# Patient Record
Sex: FEMALE | Race: WHITE | NOT HISPANIC OR LATINO
[De-identification: names, ages, dates, MRNs, and addresses within clinical notes are randomized per-mention and may not be internally consistent; named-entity substitution may affect disease eponyms.]

---

## 2017-01-12 ENCOUNTER — RECORD ABSTRACTING (OUTPATIENT)
Age: 52
End: 2017-01-12

## 2017-01-12 DIAGNOSIS — E78.00 PURE HYPERCHOLESTEROLEMIA, UNSPECIFIED: ICD-10-CM

## 2017-11-16 ENCOUNTER — APPOINTMENT (OUTPATIENT)
Dept: PULMONOLOGY | Facility: CLINIC | Age: 52
End: 2017-11-16
Payer: MEDICAID

## 2017-11-16 VITALS
HEART RATE: 72 BPM | SYSTOLIC BLOOD PRESSURE: 128 MMHG | HEIGHT: 64 IN | WEIGHT: 245 LBS | DIASTOLIC BLOOD PRESSURE: 70 MMHG | TEMPERATURE: 98.1 F | BODY MASS INDEX: 41.83 KG/M2 | OXYGEN SATURATION: 98 %

## 2017-11-16 PROCEDURE — 99203 OFFICE O/P NEW LOW 30 MIN: CPT

## 2017-11-20 ENCOUNTER — APPOINTMENT (OUTPATIENT)
Dept: SURGERY | Facility: CLINIC | Age: 52
End: 2017-11-20
Payer: MEDICAID

## 2017-11-20 VITALS
TEMPERATURE: 98 F | HEIGHT: 64 IN | OXYGEN SATURATION: 97 % | HEART RATE: 81 BPM | WEIGHT: 245 LBS | SYSTOLIC BLOOD PRESSURE: 122 MMHG | BODY MASS INDEX: 41.83 KG/M2 | DIASTOLIC BLOOD PRESSURE: 77 MMHG

## 2017-11-20 DIAGNOSIS — Z83.3 FAMILY HISTORY OF DIABETES MELLITUS: ICD-10-CM

## 2017-11-20 DIAGNOSIS — Z82.5 FAMILY HISTORY OF ASTHMA AND OTHER CHRONIC LOWER RESPIRATORY DISEASES: ICD-10-CM

## 2017-11-20 DIAGNOSIS — Z87.412 PERSONAL HISTORY OF VULVAR DYSPLASIA: ICD-10-CM

## 2017-11-20 PROCEDURE — 99203 OFFICE O/P NEW LOW 30 MIN: CPT

## 2017-12-27 ENCOUNTER — OUTPATIENT (OUTPATIENT)
Dept: OUTPATIENT SERVICES | Facility: HOSPITAL | Age: 52
LOS: 1 days | End: 2017-12-27
Payer: MEDICAID

## 2017-12-27 VITALS
TEMPERATURE: 98 F | HEIGHT: 64 IN | WEIGHT: 242.95 LBS | SYSTOLIC BLOOD PRESSURE: 130 MMHG | OXYGEN SATURATION: 98 % | RESPIRATION RATE: 18 BRPM | DIASTOLIC BLOOD PRESSURE: 76 MMHG | HEART RATE: 82 BPM

## 2017-12-27 DIAGNOSIS — Z98.82 BREAST IMPLANT STATUS: Chronic | ICD-10-CM

## 2017-12-27 DIAGNOSIS — Z98.51 TUBAL LIGATION STATUS: Chronic | ICD-10-CM

## 2017-12-27 DIAGNOSIS — Z01.818 ENCOUNTER FOR OTHER PREPROCEDURAL EXAMINATION: ICD-10-CM

## 2017-12-27 DIAGNOSIS — Z87.39 PERSONAL HISTORY OF OTHER DISEASES OF THE MUSCULOSKELETAL SYSTEM AND CONNECTIVE TISSUE: Chronic | ICD-10-CM

## 2017-12-27 DIAGNOSIS — K42.9 UMBILICAL HERNIA WITHOUT OBSTRUCTION OR GANGRENE: ICD-10-CM

## 2017-12-27 DIAGNOSIS — E78.5 HYPERLIPIDEMIA, UNSPECIFIED: ICD-10-CM

## 2017-12-27 DIAGNOSIS — Z90.49 ACQUIRED ABSENCE OF OTHER SPECIFIED PARTS OF DIGESTIVE TRACT: Chronic | ICD-10-CM

## 2017-12-27 DIAGNOSIS — K43.9 VENTRAL HERNIA WITHOUT OBSTRUCTION OR GANGRENE: ICD-10-CM

## 2017-12-27 DIAGNOSIS — Z98.890 OTHER SPECIFIED POSTPROCEDURAL STATES: Chronic | ICD-10-CM

## 2017-12-27 DIAGNOSIS — J45.909 UNSPECIFIED ASTHMA, UNCOMPLICATED: ICD-10-CM

## 2017-12-27 DIAGNOSIS — F17.200 NICOTINE DEPENDENCE, UNSPECIFIED, UNCOMPLICATED: ICD-10-CM

## 2017-12-27 DIAGNOSIS — Z98.891 HISTORY OF UTERINE SCAR FROM PREVIOUS SURGERY: Chronic | ICD-10-CM

## 2017-12-27 PROCEDURE — G0463: CPT

## 2017-12-27 RX ORDER — SODIUM CHLORIDE 9 MG/ML
3 INJECTION INTRAMUSCULAR; INTRAVENOUS; SUBCUTANEOUS EVERY 8 HOURS
Qty: 0 | Refills: 0 | Status: DISCONTINUED | OUTPATIENT
Start: 2018-01-05 | End: 2018-01-05

## 2017-12-27 NOTE — H&P PST ADULT - FAMILY HISTORY
Father  Still living? No  Family history of diabetes mellitus, Age at diagnosis: Age Unknown     Grandparent  Still living? No  Family history of breast cancer, Age at diagnosis: Age Unknown

## 2017-12-27 NOTE — H&P PST ADULT - HISTORY OF PRESENT ILLNESS
This is a 51 y/o female c/o a This is a 53 y/o female c/o abdominal hernia associated with tenderness at times, she presents today for repair of ventral umbilical hernia

## 2017-12-27 NOTE — H&P PST ADULT - PSH
History of TMJ syndrome  s/p repair jaw lock   S/P appendectomy    S/P  section  x 2  S/P ovarian cystectomy    S/P tubal ligation  2004 History of TMJ syndrome  s/p repair jaw lock   S/P appendectomy    S/P breast augmentation    S/P  section  x 2  S/P ovarian cystectomy    S/P tubal ligation  2004

## 2017-12-27 NOTE — H&P PST ADULT - NSANTHOSAYNRD_GEN_A_CORE
No. KIARRA screening performed.  STOP BANG Legend: 0-2 = LOW Risk; 3-4 = INTERMEDIATE Risk; 5-8 = HIGH Risk

## 2018-01-05 ENCOUNTER — RESULT REVIEW (OUTPATIENT)
Age: 53
End: 2018-01-05

## 2018-01-05 ENCOUNTER — OUTPATIENT (OUTPATIENT)
Dept: OUTPATIENT SERVICES | Facility: HOSPITAL | Age: 53
LOS: 1 days | End: 2018-01-05
Payer: MEDICAID

## 2018-01-05 ENCOUNTER — TRANSCRIPTION ENCOUNTER (OUTPATIENT)
Age: 53
End: 2018-01-05

## 2018-01-05 VITALS
TEMPERATURE: 98 F | OXYGEN SATURATION: 96 % | SYSTOLIC BLOOD PRESSURE: 121 MMHG | HEART RATE: 78 BPM | RESPIRATION RATE: 16 BRPM | DIASTOLIC BLOOD PRESSURE: 81 MMHG

## 2018-01-05 VITALS
HEIGHT: 64 IN | TEMPERATURE: 98 F | WEIGHT: 242.95 LBS | DIASTOLIC BLOOD PRESSURE: 89 MMHG | OXYGEN SATURATION: 100 % | HEART RATE: 80 BPM | RESPIRATION RATE: 14 BRPM | SYSTOLIC BLOOD PRESSURE: 127 MMHG

## 2018-01-05 DIAGNOSIS — Z98.891 HISTORY OF UTERINE SCAR FROM PREVIOUS SURGERY: Chronic | ICD-10-CM

## 2018-01-05 DIAGNOSIS — Z98.890 OTHER SPECIFIED POSTPROCEDURAL STATES: Chronic | ICD-10-CM

## 2018-01-05 DIAGNOSIS — Z90.49 ACQUIRED ABSENCE OF OTHER SPECIFIED PARTS OF DIGESTIVE TRACT: Chronic | ICD-10-CM

## 2018-01-05 DIAGNOSIS — K43.9 VENTRAL HERNIA WITHOUT OBSTRUCTION OR GANGRENE: ICD-10-CM

## 2018-01-05 DIAGNOSIS — K42.9 UMBILICAL HERNIA WITHOUT OBSTRUCTION OR GANGRENE: ICD-10-CM

## 2018-01-05 DIAGNOSIS — Z98.51 TUBAL LIGATION STATUS: Chronic | ICD-10-CM

## 2018-01-05 DIAGNOSIS — Z98.82 BREAST IMPLANT STATUS: Chronic | ICD-10-CM

## 2018-01-05 DIAGNOSIS — Z87.39 PERSONAL HISTORY OF OTHER DISEASES OF THE MUSCULOSKELETAL SYSTEM AND CONNECTIVE TISSUE: Chronic | ICD-10-CM

## 2018-01-05 PROCEDURE — 49560: CPT

## 2018-01-05 PROCEDURE — 49560: CPT | Mod: AS

## 2018-01-05 PROCEDURE — 49585: CPT

## 2018-01-05 PROCEDURE — 49585: CPT | Mod: AS

## 2018-01-05 PROCEDURE — 93005 ELECTROCARDIOGRAM TRACING: CPT

## 2018-01-05 PROCEDURE — 88302 TISSUE EXAM BY PATHOLOGIST: CPT | Mod: 26

## 2018-01-05 RX ORDER — ACETAMINOPHEN WITH CODEINE 300MG-30MG
1 TABLET ORAL EVERY 4 HOURS
Qty: 0 | Refills: 0 | Status: DISCONTINUED | OUTPATIENT
Start: 2018-01-05 | End: 2018-01-05

## 2018-01-05 RX ORDER — SODIUM CHLORIDE 9 MG/ML
1000 INJECTION INTRAMUSCULAR; INTRAVENOUS; SUBCUTANEOUS
Qty: 0 | Refills: 0 | Status: DISCONTINUED | OUTPATIENT
Start: 2018-01-05 | End: 2018-01-13

## 2018-01-05 RX ORDER — ONDANSETRON 8 MG/1
4 TABLET, FILM COATED ORAL ONCE
Qty: 0 | Refills: 0 | Status: DISCONTINUED | OUTPATIENT
Start: 2018-01-05 | End: 2018-01-05

## 2018-01-05 RX ORDER — ACETAMINOPHEN 500 MG
1000 TABLET ORAL ONCE
Qty: 0 | Refills: 0 | Status: COMPLETED | OUTPATIENT
Start: 2018-01-05 | End: 2018-01-05

## 2018-01-05 RX ORDER — HYDROMORPHONE HYDROCHLORIDE 2 MG/ML
1 INJECTION INTRAMUSCULAR; INTRAVENOUS; SUBCUTANEOUS
Qty: 0 | Refills: 0 | Status: DISCONTINUED | OUTPATIENT
Start: 2018-01-05 | End: 2018-01-05

## 2018-01-05 RX ORDER — ALBUTEROL 90 UG/1
2 AEROSOL, METERED ORAL
Qty: 0 | Refills: 0 | COMMUNITY

## 2018-01-05 RX ORDER — ACETAMINOPHEN WITH CODEINE 300MG-30MG
1 TABLET ORAL
Qty: 8 | Refills: 0 | OUTPATIENT
Start: 2018-01-05

## 2018-01-05 RX ORDER — FENTANYL CITRATE 50 UG/ML
25 INJECTION INTRAVENOUS
Qty: 0 | Refills: 0 | Status: DISCONTINUED | OUTPATIENT
Start: 2018-01-05 | End: 2018-01-05

## 2018-01-05 RX ADMIN — SODIUM CHLORIDE 3 MILLILITER(S): 9 INJECTION INTRAMUSCULAR; INTRAVENOUS; SUBCUTANEOUS at 06:52

## 2018-01-05 RX ADMIN — FENTANYL CITRATE 25 MICROGRAM(S): 50 INJECTION INTRAVENOUS at 09:38

## 2018-01-05 RX ADMIN — HYDROMORPHONE HYDROCHLORIDE 1 MILLIGRAM(S): 2 INJECTION INTRAMUSCULAR; INTRAVENOUS; SUBCUTANEOUS at 11:55

## 2018-01-05 RX ADMIN — FENTANYL CITRATE 25 MICROGRAM(S): 50 INJECTION INTRAVENOUS at 10:38

## 2018-01-05 RX ADMIN — FENTANYL CITRATE 25 MICROGRAM(S): 50 INJECTION INTRAVENOUS at 10:08

## 2018-01-05 RX ADMIN — HYDROMORPHONE HYDROCHLORIDE 1 MILLIGRAM(S): 2 INJECTION INTRAMUSCULAR; INTRAVENOUS; SUBCUTANEOUS at 12:25

## 2018-01-05 RX ADMIN — FENTANYL CITRATE 25 MICROGRAM(S): 50 INJECTION INTRAVENOUS at 09:53

## 2018-01-05 RX ADMIN — Medication 400 MILLIGRAM(S): at 10:38

## 2018-01-05 RX ADMIN — Medication 1000 MILLIGRAM(S): at 12:25

## 2018-01-05 RX ADMIN — FENTANYL CITRATE 25 MICROGRAM(S): 50 INJECTION INTRAVENOUS at 10:23

## 2018-01-05 NOTE — ASU DISCHARGE PLAN (ADULT/PEDIATRIC). - MEDICATION SUMMARY - MEDICATIONS TO TAKE
I will START or STAY ON the medications listed below when I get home from the hospital:    acetaminophen-codeine 300 mg-30 mg oral tablet  -- 1 tab(s) by mouth every 4 hours, As needed, Moderate Pain (4 - 6) MDD:4  -- Indication: For if pain    Motrin 400 mg oral tablet  -- 1 tab(s) by mouth once a day, As Needed  -- Indication: For .    Lipitor 10 mg oral tablet  -- 1 tab(s) by mouth once a day  -- Indication: For .    Ventolin HFA 90 mcg/inh inhalation aerosol  -- 1 puff(s) inhaled once a day  -- Indication: For .    Visine 0.05% ophthalmic solution  -- 1 drop(s) to each affected eye 2 times a day  -- Indication: For .    Synthroid 125 mcg (0.125 mg) oral tablet  -- 1 tab(s) by mouth once a day  -- Indication: For .

## 2018-01-05 NOTE — BRIEF OPERATIVE NOTE - POST-OP DX
Umbilical hernia without obstruction and without gangrene  01/05/2018    Active  Sameer Trevizo  Ventral hernia without obstruction or gangrene  01/05/2018    Active  Sameer Trevizo

## 2018-01-05 NOTE — BRIEF OPERATIVE NOTE - PROCEDURE
<<-----Click on this checkbox to enter Procedure Umbilical hernia repair  01/05/2018    Active  RLIND  Repair of hernia ventral  01/05/2018    Active  RLIND

## 2018-01-05 NOTE — ASU PATIENT PROFILE, ADULT - PSH
History of TMJ syndrome  s/p repair jaw lock   S/P appendectomy    S/P breast augmentation    S/P  section  x 2  S/P ovarian cystectomy    S/P tubal ligation  2004

## 2018-01-08 LAB — SURGICAL PATHOLOGY FINAL REPORT - CH: SIGNIFICANT CHANGE UP

## 2018-01-11 ENCOUNTER — APPOINTMENT (OUTPATIENT)
Dept: SURGERY | Facility: CLINIC | Age: 53
End: 2018-01-11
Payer: MEDICAID

## 2018-01-11 VITALS
WEIGHT: 243 LBS | OXYGEN SATURATION: 97 % | BODY MASS INDEX: 41.48 KG/M2 | HEIGHT: 64 IN | DIASTOLIC BLOOD PRESSURE: 83 MMHG | SYSTOLIC BLOOD PRESSURE: 133 MMHG | HEART RATE: 86 BPM | TEMPERATURE: 98.3 F

## 2018-01-11 PROCEDURE — 99024 POSTOP FOLLOW-UP VISIT: CPT

## 2018-01-25 ENCOUNTER — APPOINTMENT (OUTPATIENT)
Dept: PULMONOLOGY | Facility: CLINIC | Age: 53
End: 2018-01-25

## 2018-01-30 ENCOUNTER — APPOINTMENT (OUTPATIENT)
Dept: SURGERY | Facility: CLINIC | Age: 53
End: 2018-01-30
Payer: MEDICAID

## 2018-01-30 VITALS
BODY MASS INDEX: 41.48 KG/M2 | HEART RATE: 84 BPM | TEMPERATURE: 97.9 F | WEIGHT: 243 LBS | DIASTOLIC BLOOD PRESSURE: 84 MMHG | HEIGHT: 64 IN | SYSTOLIC BLOOD PRESSURE: 120 MMHG

## 2018-01-30 PROCEDURE — 99024 POSTOP FOLLOW-UP VISIT: CPT

## 2018-02-22 ENCOUNTER — APPOINTMENT (OUTPATIENT)
Dept: SURGERY | Facility: CLINIC | Age: 53
End: 2018-02-22
Payer: MEDICAID

## 2018-02-22 VITALS
WEIGHT: 243 LBS | HEIGHT: 64 IN | BODY MASS INDEX: 41.48 KG/M2 | DIASTOLIC BLOOD PRESSURE: 90 MMHG | HEART RATE: 72 BPM | SYSTOLIC BLOOD PRESSURE: 124 MMHG

## 2018-02-22 PROCEDURE — 99024 POSTOP FOLLOW-UP VISIT: CPT

## 2018-06-15 ENCOUNTER — APPOINTMENT (OUTPATIENT)
Dept: ORTHOPEDIC SURGERY | Facility: CLINIC | Age: 53
End: 2018-06-15
Payer: MEDICAID

## 2018-06-15 VITALS
OXYGEN SATURATION: 98 % | WEIGHT: 232 LBS | HEART RATE: 83 BPM | HEIGHT: 64 IN | BODY MASS INDEX: 39.61 KG/M2 | DIASTOLIC BLOOD PRESSURE: 82 MMHG | SYSTOLIC BLOOD PRESSURE: 123 MMHG | TEMPERATURE: 98.5 F

## 2018-06-15 PROCEDURE — 99203 OFFICE O/P NEW LOW 30 MIN: CPT

## 2018-06-15 PROCEDURE — 72100 X-RAY EXAM L-S SPINE 2/3 VWS: CPT

## 2018-06-25 ENCOUNTER — APPOINTMENT (OUTPATIENT)
Dept: SURGERY | Facility: CLINIC | Age: 53
End: 2018-06-25
Payer: MEDICAID

## 2018-06-25 VITALS
TEMPERATURE: 98 F | HEIGHT: 64 IN | BODY MASS INDEX: 38.41 KG/M2 | DIASTOLIC BLOOD PRESSURE: 84 MMHG | WEIGHT: 225 LBS | SYSTOLIC BLOOD PRESSURE: 132 MMHG | HEART RATE: 77 BPM

## 2018-06-25 PROCEDURE — 99213 OFFICE O/P EST LOW 20 MIN: CPT

## 2018-06-26 LAB
ANION GAP SERPL CALC-SCNC: 15 MMOL/L
BASOPHILS # BLD AUTO: 0.02 K/UL
BASOPHILS NFR BLD AUTO: 0.3 %
BUN SERPL-MCNC: 16 MG/DL
CALCIUM SERPL-MCNC: 9.8 MG/DL
CHLORIDE SERPL-SCNC: 105 MMOL/L
CO2 SERPL-SCNC: 25 MMOL/L
CREAT SERPL-MCNC: 0.71 MG/DL
EOSINOPHIL # BLD AUTO: 0.04 K/UL
EOSINOPHIL NFR BLD AUTO: 0.5 %
GLUCOSE SERPL-MCNC: 95 MG/DL
HCT VFR BLD CALC: 42.9 %
HGB BLD-MCNC: 13.5 G/DL
IMM GRANULOCYTES NFR BLD AUTO: 0.3 %
LYMPHOCYTES # BLD AUTO: 2.47 K/UL
LYMPHOCYTES NFR BLD AUTO: 32.9 %
MAN DIFF?: NORMAL
MCHC RBC-ENTMCNC: 28.5 PG
MCHC RBC-ENTMCNC: 31.5 GM/DL
MCV RBC AUTO: 90.7 FL
MONOCYTES # BLD AUTO: 0.42 K/UL
MONOCYTES NFR BLD AUTO: 5.6 %
NEUTROPHILS # BLD AUTO: 4.54 K/UL
NEUTROPHILS NFR BLD AUTO: 60.4 %
PLATELET # BLD AUTO: 205 K/UL
POTASSIUM SERPL-SCNC: 4.9 MMOL/L
RBC # BLD: 4.73 M/UL
RBC # FLD: 14.2 %
SODIUM SERPL-SCNC: 145 MMOL/L
WBC # FLD AUTO: 7.51 K/UL

## 2018-07-06 ENCOUNTER — APPOINTMENT (OUTPATIENT)
Dept: ORTHOPEDIC SURGERY | Facility: CLINIC | Age: 53
End: 2018-07-06
Payer: MEDICAID

## 2018-07-06 DIAGNOSIS — M51.36 OTHER INTERVERTEBRAL DISC DEGENERATION, LUMBAR REGION: ICD-10-CM

## 2018-07-06 DIAGNOSIS — M48.07 SPINAL STENOSIS, LUMBOSACRAL REGION: ICD-10-CM

## 2018-07-06 PROCEDURE — 99214 OFFICE O/P EST MOD 30 MIN: CPT

## 2018-07-11 ENCOUNTER — OUTPATIENT (OUTPATIENT)
Dept: OUTPATIENT SERVICES | Facility: HOSPITAL | Age: 53
LOS: 1 days | End: 2018-07-11
Payer: MEDICAID

## 2018-07-11 VITALS
DIASTOLIC BLOOD PRESSURE: 74 MMHG | SYSTOLIC BLOOD PRESSURE: 130 MMHG | HEART RATE: 78 BPM | OXYGEN SATURATION: 98 % | RESPIRATION RATE: 18 BRPM | HEIGHT: 64 IN | TEMPERATURE: 99 F | WEIGHT: 231.04 LBS

## 2018-07-11 DIAGNOSIS — F17.200 NICOTINE DEPENDENCE, UNSPECIFIED, UNCOMPLICATED: ICD-10-CM

## 2018-07-11 DIAGNOSIS — Z98.891 HISTORY OF UTERINE SCAR FROM PREVIOUS SURGERY: Chronic | ICD-10-CM

## 2018-07-11 DIAGNOSIS — E78.5 HYPERLIPIDEMIA, UNSPECIFIED: ICD-10-CM

## 2018-07-11 DIAGNOSIS — Z01.818 ENCOUNTER FOR OTHER PREPROCEDURAL EXAMINATION: ICD-10-CM

## 2018-07-11 DIAGNOSIS — Z98.51 TUBAL LIGATION STATUS: Chronic | ICD-10-CM

## 2018-07-11 DIAGNOSIS — Z90.49 ACQUIRED ABSENCE OF OTHER SPECIFIED PARTS OF DIGESTIVE TRACT: Chronic | ICD-10-CM

## 2018-07-11 DIAGNOSIS — Z98.890 OTHER SPECIFIED POSTPROCEDURAL STATES: Chronic | ICD-10-CM

## 2018-07-11 DIAGNOSIS — R22.9 LOCALIZED SWELLING, MASS AND LUMP, UNSPECIFIED: ICD-10-CM

## 2018-07-11 DIAGNOSIS — R19.00 INTRA-ABDOMINAL AND PELVIC SWELLING, MASS AND LUMP, UNSPECIFIED SITE: ICD-10-CM

## 2018-07-11 DIAGNOSIS — K14.9 DISEASE OF TONGUE, UNSPECIFIED: Chronic | ICD-10-CM

## 2018-07-11 DIAGNOSIS — Z87.39 PERSONAL HISTORY OF OTHER DISEASES OF THE MUSCULOSKELETAL SYSTEM AND CONNECTIVE TISSUE: Chronic | ICD-10-CM

## 2018-07-11 DIAGNOSIS — Z98.82 BREAST IMPLANT STATUS: Chronic | ICD-10-CM

## 2018-07-11 PROCEDURE — G0463: CPT

## 2018-07-11 RX ORDER — IBUPROFEN 200 MG
1 TABLET ORAL
Qty: 0 | Refills: 0 | COMMUNITY

## 2018-07-11 RX ORDER — ALBUTEROL 90 UG/1
1 AEROSOL, METERED ORAL
Qty: 0 | Refills: 0 | COMMUNITY

## 2018-07-11 RX ORDER — TETRAHYDROZOLINE/POLYETHYL GLY 0.05 %-1 %
1 DROPS OPHTHALMIC (EYE)
Qty: 0 | Refills: 0 | COMMUNITY

## 2018-07-11 RX ORDER — SODIUM CHLORIDE 9 MG/ML
3 INJECTION INTRAMUSCULAR; INTRAVENOUS; SUBCUTANEOUS EVERY 8 HOURS
Qty: 0 | Refills: 0 | Status: DISCONTINUED | OUTPATIENT
Start: 2018-07-13 | End: 2018-07-21

## 2018-07-11 NOTE — H&P PST ADULT - NEGATIVE CARDIOVASCULAR SYMPTOMS
no palpitations/no orthopnea/no peripheral edema/no dyspnea on exertion/no paroxysmal nocturnal dyspnea/no claudication/no chest pain

## 2018-07-11 NOTE — H&P PST ADULT - PMH
Asthma  resolved, on no med  Hyperlipidemia    Hypothyroid    Morbid obesity Asthma  resolved, on no med  Back pain    Hyperlipidemia    Hypothyroid    Morbid obesity    Smoker unmotivated to quit

## 2018-07-11 NOTE — H&P PST ADULT - HISTORY OF PRESENT ILLNESS
This is a 53 y/o female c/o left flank mass associated with tenderness at times, she presents today for excision of left flank mass

## 2018-07-11 NOTE — H&P PST ADULT - PSH
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

## 2018-07-12 ENCOUNTER — TRANSCRIPTION ENCOUNTER (OUTPATIENT)
Age: 53
End: 2018-07-12

## 2018-07-13 ENCOUNTER — OUTPATIENT (OUTPATIENT)
Dept: OUTPATIENT SERVICES | Facility: HOSPITAL | Age: 53
LOS: 1 days | End: 2018-07-13
Payer: MEDICAID

## 2018-07-13 ENCOUNTER — APPOINTMENT (OUTPATIENT)
Dept: SURGERY | Facility: HOSPITAL | Age: 53
End: 2018-07-13

## 2018-07-13 ENCOUNTER — RESULT REVIEW (OUTPATIENT)
Age: 53
End: 2018-07-13

## 2018-07-13 VITALS
RESPIRATION RATE: 16 BRPM | SYSTOLIC BLOOD PRESSURE: 122 MMHG | HEART RATE: 77 BPM | DIASTOLIC BLOOD PRESSURE: 72 MMHG | TEMPERATURE: 98 F | HEIGHT: 64 IN | OXYGEN SATURATION: 98 % | WEIGHT: 231.04 LBS

## 2018-07-13 VITALS
OXYGEN SATURATION: 98 % | DIASTOLIC BLOOD PRESSURE: 66 MMHG | HEART RATE: 76 BPM | TEMPERATURE: 98 F | RESPIRATION RATE: 16 BRPM | SYSTOLIC BLOOD PRESSURE: 118 MMHG

## 2018-07-13 DIAGNOSIS — Z98.890 OTHER SPECIFIED POSTPROCEDURAL STATES: Chronic | ICD-10-CM

## 2018-07-13 DIAGNOSIS — R19.00 INTRA-ABDOMINAL AND PELVIC SWELLING, MASS AND LUMP, UNSPECIFIED SITE: ICD-10-CM

## 2018-07-13 DIAGNOSIS — Z87.39 PERSONAL HISTORY OF OTHER DISEASES OF THE MUSCULOSKELETAL SYSTEM AND CONNECTIVE TISSUE: Chronic | ICD-10-CM

## 2018-07-13 DIAGNOSIS — Z98.51 TUBAL LIGATION STATUS: Chronic | ICD-10-CM

## 2018-07-13 DIAGNOSIS — Z98.891 HISTORY OF UTERINE SCAR FROM PREVIOUS SURGERY: Chronic | ICD-10-CM

## 2018-07-13 DIAGNOSIS — Z98.82 BREAST IMPLANT STATUS: Chronic | ICD-10-CM

## 2018-07-13 DIAGNOSIS — Z90.49 ACQUIRED ABSENCE OF OTHER SPECIFIED PARTS OF DIGESTIVE TRACT: Chronic | ICD-10-CM

## 2018-07-13 DIAGNOSIS — K14.9 DISEASE OF TONGUE, UNSPECIFIED: Chronic | ICD-10-CM

## 2018-07-13 PROCEDURE — 21932 EXC BACK TUM DEEP < 5 CM: CPT

## 2018-07-13 RX ORDER — OXYCODONE AND ACETAMINOPHEN 5; 325 MG/1; MG/1
1 TABLET ORAL EVERY 4 HOURS
Qty: 0 | Refills: 0 | Status: DISCONTINUED | OUTPATIENT
Start: 2018-07-13 | End: 2018-07-13

## 2018-07-13 RX ORDER — ONDANSETRON 8 MG/1
4 TABLET, FILM COATED ORAL ONCE
Qty: 0 | Refills: 0 | Status: DISCONTINUED | OUTPATIENT
Start: 2018-07-13 | End: 2018-07-13

## 2018-07-13 RX ORDER — FENTANYL CITRATE 50 UG/ML
25 INJECTION INTRAVENOUS
Qty: 0 | Refills: 0 | Status: DISCONTINUED | OUTPATIENT
Start: 2018-07-13 | End: 2018-07-13

## 2018-07-13 RX ORDER — ACETAMINOPHEN 500 MG
1000 TABLET ORAL ONCE
Qty: 0 | Refills: 0 | Status: DISCONTINUED | OUTPATIENT
Start: 2018-07-13 | End: 2018-07-13

## 2018-07-13 RX ORDER — SODIUM CHLORIDE 9 MG/ML
1000 INJECTION, SOLUTION INTRAVENOUS
Qty: 0 | Refills: 0 | Status: DISCONTINUED | OUTPATIENT
Start: 2018-07-13 | End: 2018-07-21

## 2018-07-13 RX ORDER — OXYCODONE AND ACETAMINOPHEN 5; 325 MG/1; MG/1
1 TABLET ORAL
Qty: 16 | Refills: 0
Start: 2018-07-13

## 2018-07-13 RX ADMIN — SODIUM CHLORIDE 3 MILLILITER(S): 9 INJECTION INTRAMUSCULAR; INTRAVENOUS; SUBCUTANEOUS at 08:58

## 2018-07-13 NOTE — ASU PREOP CHECKLIST - 1.
pt. stated she has "panic attacks right before anesthesia is given, requests her IVL to be done in OR by anesthesiologist"

## 2018-07-13 NOTE — ASU DISCHARGE PLAN (ADULT/PEDIATRIC). - MEDICATION SUMMARY - MEDICATIONS TO TAKE
I will START or STAY ON the medications listed below when I get home from the hospital:    Percocet 5/325 oral tablet  -- 1 tab(s) by mouth every 6 hours MDD:4  -- Caution federal law prohibits the transfer of this drug to any person other  than the person for whom it was prescribed.  May cause drowsiness.  Alcohol may intensify this effect.  Use care when operating dangerous machinery.  This prescription cannot be refilled.  This product contains acetaminophen.  Do not use  with any other product containing acetaminophen to prevent possible liver damage.  Using more of this medication than prescribed may cause serious breathing problems.    -- Indication: For pain    Lipitor 10 mg oral tablet  -- 1 tab(s) by mouth once a day  -- Indication: For as previously perscribed    Xiidra 5% ophthalmic solution  -- 1 drop(s) to each affected eye 2 times a day  -- Indication: For as previously perscribed    Synthroid 125 mcg (0.125 mg) oral tablet  -- 1 tab(s) by mouth once a day  -- Indication: For as previously perscribed

## 2018-07-13 NOTE — ASU PATIENT PROFILE, ADULT - PMH
Asthma  resolved, on no med  Back pain    Hyperlipidemia    Hypothyroid    Morbid obesity    Smoker unmotivated to quit

## 2018-07-13 NOTE — ASU DISCHARGE PLAN (ADULT/PEDIATRIC). - NOTIFY
Bleeding that does not stop Fever greater than 101/Pain not relieved by Medications/Bleeding that does not stop

## 2018-07-16 PROBLEM — J45.909 UNSPECIFIED ASTHMA, UNCOMPLICATED: Chronic | Status: ACTIVE | Noted: 2017-12-27

## 2018-07-17 LAB — SURGICAL PATHOLOGY FINAL REPORT - CH: SIGNIFICANT CHANGE UP

## 2018-07-20 PROBLEM — Z86.59 HISTORY OF PANIC ATTACKS: Status: RESOLVED | Noted: 2017-11-20 | Resolved: 2018-07-20

## 2018-07-23 ENCOUNTER — APPOINTMENT (OUTPATIENT)
Dept: SURGERY | Facility: CLINIC | Age: 53
End: 2018-07-23
Payer: MEDICAID

## 2018-07-23 DIAGNOSIS — Z86.59 PERSONAL HISTORY OF OTHER MENTAL AND BEHAVIORAL DISORDERS: ICD-10-CM

## 2018-07-23 PROCEDURE — 99024 POSTOP FOLLOW-UP VISIT: CPT

## 2018-10-16 PROBLEM — F17.200 NICOTINE DEPENDENCE, UNSPECIFIED, UNCOMPLICATED: Chronic | Status: ACTIVE | Noted: 2018-07-11

## 2018-10-16 PROBLEM — E03.9 HYPOTHYROIDISM, UNSPECIFIED: Chronic | Status: ACTIVE | Noted: 2017-12-27

## 2018-10-16 PROBLEM — M54.9 DORSALGIA, UNSPECIFIED: Chronic | Status: ACTIVE | Noted: 2018-07-11

## 2018-10-16 PROBLEM — E66.01 MORBID (SEVERE) OBESITY DUE TO EXCESS CALORIES: Chronic | Status: ACTIVE | Noted: 2017-12-27

## 2018-10-16 PROBLEM — E78.5 HYPERLIPIDEMIA, UNSPECIFIED: Chronic | Status: ACTIVE | Noted: 2017-12-27

## 2018-10-29 ENCOUNTER — APPOINTMENT (OUTPATIENT)
Dept: SURGERY | Facility: CLINIC | Age: 53
End: 2018-10-29
Payer: MEDICAID

## 2018-10-29 VITALS
DIASTOLIC BLOOD PRESSURE: 78 MMHG | SYSTOLIC BLOOD PRESSURE: 117 MMHG | HEIGHT: 64 IN | BODY MASS INDEX: 38.41 KG/M2 | WEIGHT: 225 LBS | HEART RATE: 77 BPM

## 2018-10-29 PROCEDURE — 99213 OFFICE O/P EST LOW 20 MIN: CPT

## 2019-01-22 ENCOUNTER — APPOINTMENT (OUTPATIENT)
Dept: SURGERY | Facility: CLINIC | Age: 54
End: 2019-01-22
Payer: MEDICAID

## 2019-01-22 VITALS
SYSTOLIC BLOOD PRESSURE: 116 MMHG | HEIGHT: 64 IN | WEIGHT: 220 LBS | HEART RATE: 73 BPM | OXYGEN SATURATION: 95 % | BODY MASS INDEX: 37.56 KG/M2 | DIASTOLIC BLOOD PRESSURE: 81 MMHG

## 2019-01-22 PROCEDURE — 99213 OFFICE O/P EST LOW 20 MIN: CPT

## 2019-10-24 ENCOUNTER — MED ADMIN CHARGE (OUTPATIENT)
Age: 54
End: 2019-10-24

## 2019-10-24 ENCOUNTER — APPOINTMENT (OUTPATIENT)
Dept: INTERNAL MEDICINE | Facility: CLINIC | Age: 54
End: 2019-10-24
Payer: COMMERCIAL

## 2019-10-24 VITALS
SYSTOLIC BLOOD PRESSURE: 95 MMHG | TEMPERATURE: 97.3 F | HEART RATE: 87 BPM | WEIGHT: 222 LBS | HEIGHT: 64 IN | DIASTOLIC BLOOD PRESSURE: 67 MMHG | OXYGEN SATURATION: 92 % | BODY MASS INDEX: 37.9 KG/M2

## 2019-10-24 DIAGNOSIS — F32.9 ANXIETY DISORDER, UNSPECIFIED: ICD-10-CM

## 2019-10-24 DIAGNOSIS — F41.9 ANXIETY DISORDER, UNSPECIFIED: ICD-10-CM

## 2019-10-24 DIAGNOSIS — Z23 ENCOUNTER FOR IMMUNIZATION: ICD-10-CM

## 2019-10-24 PROCEDURE — 99213 OFFICE O/P EST LOW 20 MIN: CPT

## 2019-10-24 PROCEDURE — G0008: CPT

## 2019-10-24 PROCEDURE — 90686 IIV4 VACC NO PRSV 0.5 ML IM: CPT

## 2019-10-24 PROCEDURE — 99203 OFFICE O/P NEW LOW 30 MIN: CPT | Mod: 25,GC

## 2019-10-24 PROCEDURE — G0444 DEPRESSION SCREEN ANNUAL: CPT

## 2019-10-24 RX ORDER — AZITHROMYCIN 250 MG/1
250 TABLET, FILM COATED ORAL
Qty: 1 | Refills: 0 | Status: DISCONTINUED | COMMUNITY
Start: 2017-11-16 | End: 2019-10-24

## 2019-10-24 RX ORDER — CLONAZEPAM 0.5 MG/1
0.5 TABLET ORAL TWICE DAILY
Qty: 20 | Refills: 0 | Status: ACTIVE | COMMUNITY
Start: 2019-10-24 | End: 1900-01-01

## 2019-10-24 RX ORDER — FLUTICASONE FUROATE 100 UG/1
100 POWDER RESPIRATORY (INHALATION) DAILY
Qty: 1 | Refills: 0 | Status: DISCONTINUED | COMMUNITY
Start: 2017-11-18 | End: 2019-10-24

## 2019-10-24 RX ORDER — METHYLPREDNISOLONE 4 MG/1
4 TABLET ORAL
Qty: 1 | Refills: 0 | Status: DISCONTINUED | COMMUNITY
Start: 2017-11-16 | End: 2019-10-24

## 2019-10-28 ENCOUNTER — APPOINTMENT (OUTPATIENT)
Dept: INTERNAL MEDICINE | Facility: CLINIC | Age: 54
End: 2019-10-28

## 2019-11-01 ENCOUNTER — APPOINTMENT (OUTPATIENT)
Dept: INTERNAL MEDICINE | Facility: CLINIC | Age: 54
End: 2019-11-01

## 2019-11-07 ENCOUNTER — APPOINTMENT (OUTPATIENT)
Dept: INTERNAL MEDICINE | Facility: CLINIC | Age: 54
End: 2019-11-07

## 2019-11-14 ENCOUNTER — CLINICAL ADVICE (OUTPATIENT)
Age: 54
End: 2019-11-14

## 2019-11-14 ENCOUNTER — APPOINTMENT (OUTPATIENT)
Dept: INTERNAL MEDICINE | Facility: CLINIC | Age: 54
End: 2019-11-14

## 2019-11-19 ENCOUNTER — APPOINTMENT (OUTPATIENT)
Dept: INTERNAL MEDICINE | Facility: CLINIC | Age: 54
End: 2019-11-19

## 2019-11-20 ENCOUNTER — APPOINTMENT (OUTPATIENT)
Dept: INTERNAL MEDICINE | Facility: CLINIC | Age: 54
End: 2019-11-20

## 2019-11-21 ENCOUNTER — OUTPATIENT (OUTPATIENT)
Dept: OUTPATIENT SERVICES | Facility: HOSPITAL | Age: 54
LOS: 1 days | Discharge: HOME | End: 2019-11-21
Payer: COMMERCIAL

## 2019-11-21 ENCOUNTER — APPOINTMENT (OUTPATIENT)
Dept: PSYCHIATRY | Facility: CLINIC | Age: 54
End: 2019-11-21

## 2019-11-21 DIAGNOSIS — Z90.49 ACQUIRED ABSENCE OF OTHER SPECIFIED PARTS OF DIGESTIVE TRACT: Chronic | ICD-10-CM

## 2019-11-21 DIAGNOSIS — Z98.890 OTHER SPECIFIED POSTPROCEDURAL STATES: Chronic | ICD-10-CM

## 2019-11-21 DIAGNOSIS — K14.9 DISEASE OF TONGUE, UNSPECIFIED: Chronic | ICD-10-CM

## 2019-11-21 DIAGNOSIS — Z98.891 HISTORY OF UTERINE SCAR FROM PREVIOUS SURGERY: Chronic | ICD-10-CM

## 2019-11-21 DIAGNOSIS — Z98.82 BREAST IMPLANT STATUS: Chronic | ICD-10-CM

## 2019-11-21 DIAGNOSIS — Z98.51 TUBAL LIGATION STATUS: Chronic | ICD-10-CM

## 2019-11-21 DIAGNOSIS — Z87.39 PERSONAL HISTORY OF OTHER DISEASES OF THE MUSCULOSKELETAL SYSTEM AND CONNECTIVE TISSUE: Chronic | ICD-10-CM

## 2019-11-21 PROCEDURE — 90792 PSYCH DIAG EVAL W/MED SRVCS: CPT

## 2019-12-01 ENCOUNTER — OUTPATIENT (OUTPATIENT)
Dept: OUTPATIENT SERVICES | Facility: HOSPITAL | Age: 54
LOS: 1 days | Discharge: HOME | End: 2019-12-01
Payer: COMMERCIAL

## 2019-12-01 DIAGNOSIS — Z98.890 OTHER SPECIFIED POSTPROCEDURAL STATES: Chronic | ICD-10-CM

## 2019-12-01 DIAGNOSIS — Z98.82 BREAST IMPLANT STATUS: Chronic | ICD-10-CM

## 2019-12-01 DIAGNOSIS — Z98.891 HISTORY OF UTERINE SCAR FROM PREVIOUS SURGERY: Chronic | ICD-10-CM

## 2019-12-01 DIAGNOSIS — Z87.39 PERSONAL HISTORY OF OTHER DISEASES OF THE MUSCULOSKELETAL SYSTEM AND CONNECTIVE TISSUE: Chronic | ICD-10-CM

## 2019-12-01 DIAGNOSIS — K14.9 DISEASE OF TONGUE, UNSPECIFIED: Chronic | ICD-10-CM

## 2019-12-01 DIAGNOSIS — Z98.51 TUBAL LIGATION STATUS: Chronic | ICD-10-CM

## 2019-12-01 DIAGNOSIS — Z90.49 ACQUIRED ABSENCE OF OTHER SPECIFIED PARTS OF DIGESTIVE TRACT: Chronic | ICD-10-CM

## 2019-12-04 ENCOUNTER — APPOINTMENT (OUTPATIENT)
Dept: INTERNAL MEDICINE | Facility: CLINIC | Age: 54
End: 2019-12-04

## 2019-12-12 ENCOUNTER — APPOINTMENT (OUTPATIENT)
Dept: PSYCHIATRY | Facility: CLINIC | Age: 54
End: 2019-12-12

## 2019-12-31 DIAGNOSIS — F33.1 MAJOR DEPRESSIVE DISORDER, RECURRENT, MODERATE: ICD-10-CM

## 2020-01-07 ENCOUNTER — APPOINTMENT (OUTPATIENT)
Dept: INTERNAL MEDICINE | Facility: CLINIC | Age: 55
End: 2020-01-07

## 2020-01-09 ENCOUNTER — APPOINTMENT (OUTPATIENT)
Dept: PSYCHIATRY | Facility: CLINIC | Age: 55
End: 2020-01-09

## 2020-01-14 ENCOUNTER — LABORATORY RESULT (OUTPATIENT)
Age: 55
End: 2020-01-14

## 2020-01-14 ENCOUNTER — APPOINTMENT (OUTPATIENT)
Dept: INTERNAL MEDICINE | Facility: CLINIC | Age: 55
End: 2020-01-14
Payer: COMMERCIAL

## 2020-01-14 ENCOUNTER — TRANSCRIPTION ENCOUNTER (OUTPATIENT)
Age: 55
End: 2020-01-14

## 2020-01-14 VITALS
HEIGHT: 64 IN | HEART RATE: 107 BPM | SYSTOLIC BLOOD PRESSURE: 127 MMHG | WEIGHT: 214 LBS | OXYGEN SATURATION: 93 % | DIASTOLIC BLOOD PRESSURE: 87 MMHG | BODY MASS INDEX: 36.54 KG/M2 | TEMPERATURE: 97.4 F

## 2020-01-14 DIAGNOSIS — Z98.890 OTHER SPECIFIED POSTPROCEDURAL STATES: ICD-10-CM

## 2020-01-14 DIAGNOSIS — B36.9 SUPERFICIAL MYCOSIS, UNSPECIFIED: ICD-10-CM

## 2020-01-14 DIAGNOSIS — R61 GENERALIZED HYPERHIDROSIS: ICD-10-CM

## 2020-01-14 DIAGNOSIS — K63.5 POLYP OF COLON: ICD-10-CM

## 2020-01-14 PROCEDURE — 36415 COLL VENOUS BLD VENIPUNCTURE: CPT

## 2020-01-14 PROCEDURE — G0442 ANNUAL ALCOHOL SCREEN 15 MIN: CPT

## 2020-01-14 PROCEDURE — 99214 OFFICE O/P EST MOD 30 MIN: CPT | Mod: GC,25

## 2020-01-14 PROCEDURE — 99406 BEHAV CHNG SMOKING 3-10 MIN: CPT

## 2020-01-14 RX ORDER — IBUPROFEN 200 MG/1
200 TABLET, FILM COATED ORAL
Refills: 0 | Status: DISCONTINUED | COMMUNITY
Start: 2017-11-18 | End: 2020-01-14

## 2020-01-14 RX ORDER — CITALOPRAM HYDROBROMIDE 20 MG/1
20 TABLET, FILM COATED ORAL DAILY
Qty: 30 | Refills: 0 | Status: DISCONTINUED | COMMUNITY
Start: 2019-10-24 | End: 2020-01-14

## 2020-01-14 RX ORDER — VENLAFAXINE HYDROCHLORIDE 75 MG/1
75 CAPSULE, EXTENDED RELEASE ORAL
Refills: 0 | Status: ACTIVE | COMMUNITY
Start: 2020-01-14

## 2020-01-16 ENCOUNTER — APPOINTMENT (OUTPATIENT)
Dept: PSYCHIATRY | Facility: CLINIC | Age: 55
End: 2020-01-16

## 2020-01-16 LAB
ALBUMIN SERPL ELPH-MCNC: 4.9 G/DL
ALP BLD-CCNC: 75 U/L
ALT SERPL-CCNC: 12 U/L
ANION GAP SERPL CALC-SCNC: 17 MMOL/L
APTT BLD: 32.5 SEC
AST SERPL-CCNC: 17 U/L
BASOPHILS # BLD AUTO: 0.02 K/UL
BASOPHILS NFR BLD AUTO: 0.2 %
BILIRUB SERPL-MCNC: 0.3 MG/DL
BUN SERPL-MCNC: 18 MG/DL
CALCIUM SERPL-MCNC: 10.2 MG/DL
CHLORIDE SERPL-SCNC: 103 MMOL/L
CHOLEST SERPL-MCNC: 130 MG/DL
CHOLEST/HDLC SERPL: 2.6 RATIO
CO2 SERPL-SCNC: 23 MMOL/L
CREAT SERPL-MCNC: 1.04 MG/DL
EOSINOPHIL # BLD AUTO: 0.11 K/UL
EOSINOPHIL NFR BLD AUTO: 1.2 %
ESTIMATED AVERAGE GLUCOSE: 117 MG/DL
GLUCOSE SERPL-MCNC: 75 MG/DL
HBA1C MFR BLD HPLC: 5.7 %
HCT VFR BLD CALC: 48.2 %
HDLC SERPL-MCNC: 50 MG/DL
HGB BLD-MCNC: 15.5 G/DL
IMM GRANULOCYTES NFR BLD AUTO: 0.4 %
LDLC SERPL CALC-MCNC: 54 MG/DL
LYMPHOCYTES # BLD AUTO: 2.23 K/UL
LYMPHOCYTES NFR BLD AUTO: 23.5 %
MAN DIFF?: NORMAL
MCHC RBC-ENTMCNC: 30.1 PG
MCHC RBC-ENTMCNC: 32.2 GM/DL
MCV RBC AUTO: 93.6 FL
MONOCYTES # BLD AUTO: 0.45 K/UL
MONOCYTES NFR BLD AUTO: 4.7 %
MPO AB + PR3 PNL SER: NORMAL
NEUTROPHILS # BLD AUTO: 6.63 K/UL
NEUTROPHILS NFR BLD AUTO: 70 %
PLATELET # BLD AUTO: 219 K/UL
POTASSIUM SERPL-SCNC: 4.4 MMOL/L
PROT SERPL-MCNC: 7.4 G/DL
RBC # BLD: 5.15 M/UL
RBC # FLD: 13.3 %
SODIUM SERPL-SCNC: 143 MMOL/L
TRIGL SERPL-MCNC: 132 MG/DL
TSH SERPL-ACNC: 1.75 UIU/ML
WBC # FLD AUTO: 9.48 K/UL

## 2020-01-28 ENCOUNTER — APPOINTMENT (OUTPATIENT)
Dept: INTERNAL MEDICINE | Facility: CLINIC | Age: 55
End: 2020-01-28
Payer: COMMERCIAL

## 2020-01-28 VITALS
BODY MASS INDEX: 37.56 KG/M2 | HEART RATE: 93 BPM | TEMPERATURE: 97.1 F | DIASTOLIC BLOOD PRESSURE: 76 MMHG | WEIGHT: 220 LBS | OXYGEN SATURATION: 96 % | SYSTOLIC BLOOD PRESSURE: 114 MMHG | HEIGHT: 64 IN

## 2020-01-28 DIAGNOSIS — J30.9 ALLERGIC RHINITIS, UNSPECIFIED: ICD-10-CM

## 2020-01-28 DIAGNOSIS — Z72.0 TOBACCO USE: ICD-10-CM

## 2020-01-28 PROCEDURE — 99406 BEHAV CHNG SMOKING 3-10 MIN: CPT

## 2020-01-28 PROCEDURE — 99214 OFFICE O/P EST MOD 30 MIN: CPT | Mod: GC,25

## 2020-01-28 RX ORDER — VARENICLINE TARTRATE 0.5 (11)-1
0.5 MG X 11 & KIT ORAL
Qty: 1 | Refills: 0 | Status: ACTIVE | COMMUNITY
Start: 2020-01-28 | End: 1900-01-01

## 2020-01-28 RX ORDER — FLUTICASONE PROPIONATE 50 UG/1
50 SPRAY, METERED NASAL DAILY
Qty: 1 | Refills: 0 | Status: ACTIVE | COMMUNITY
Start: 2020-01-28 | End: 1900-01-01

## 2020-01-30 ENCOUNTER — APPOINTMENT (OUTPATIENT)
Dept: PSYCHIATRY | Facility: CLINIC | Age: 55
End: 2020-01-30

## 2020-01-30 RX ORDER — BUTALBITAL, ACETAMINOPHEN AND CAFFEINE 300; 50; 40 MG/1; MG/1; MG/1
50-300-40 CAPSULE ORAL EVERY 4 HOURS
Qty: 30 | Refills: 0 | Status: ACTIVE | COMMUNITY
Start: 2020-01-30 | End: 1900-01-01

## 2020-01-31 ENCOUNTER — APPOINTMENT (OUTPATIENT)
Dept: PSYCHIATRY | Facility: CLINIC | Age: 55
End: 2020-01-31

## 2020-02-11 ENCOUNTER — APPOINTMENT (OUTPATIENT)
Dept: INTERNAL MEDICINE | Facility: CLINIC | Age: 55
End: 2020-02-11
Payer: COMMERCIAL

## 2020-02-11 VITALS
WEIGHT: 218 LBS | SYSTOLIC BLOOD PRESSURE: 116 MMHG | TEMPERATURE: 97.8 F | HEIGHT: 64 IN | HEART RATE: 78 BPM | OXYGEN SATURATION: 96 % | DIASTOLIC BLOOD PRESSURE: 78 MMHG | BODY MASS INDEX: 37.22 KG/M2

## 2020-02-11 DIAGNOSIS — R09.81 NASAL CONGESTION: ICD-10-CM

## 2020-02-11 DIAGNOSIS — M47.817 SPONDYLOSIS W/OUT MYELOPATHY OR RADICULOPATHY, LUMBOSACRAL REGION: ICD-10-CM

## 2020-02-11 DIAGNOSIS — Z12.39 ENCOUNTER FOR OTHER SCREENING FOR MALIGNANT NEOPLASM OF BREAST: ICD-10-CM

## 2020-02-11 PROCEDURE — 99214 OFFICE O/P EST MOD 30 MIN: CPT | Mod: 25,GC

## 2020-02-12 ENCOUNTER — TRANSCRIPTION ENCOUNTER (OUTPATIENT)
Age: 55
End: 2020-02-12

## 2020-02-14 ENCOUNTER — APPOINTMENT (OUTPATIENT)
Dept: PSYCHIATRY | Facility: CLINIC | Age: 55
End: 2020-02-14

## 2020-02-20 ENCOUNTER — APPOINTMENT (OUTPATIENT)
Dept: PSYCHIATRY | Facility: CLINIC | Age: 55
End: 2020-02-20

## 2020-02-24 DIAGNOSIS — F32.1 MAJOR DEPRESSIVE DISORDER, SINGLE EPISODE, MODERATE: ICD-10-CM

## 2020-02-25 ENCOUNTER — LABORATORY RESULT (OUTPATIENT)
Age: 55
End: 2020-02-25

## 2020-02-25 ENCOUNTER — APPOINTMENT (OUTPATIENT)
Dept: RHEUMATOLOGY | Facility: CLINIC | Age: 55
End: 2020-02-25
Payer: MEDICAID

## 2020-02-25 PROCEDURE — 99205 OFFICE O/P NEW HI 60 MIN: CPT

## 2020-02-27 NOTE — PHYSICAL EXAM
[General Appearance - In No Acute Distress] : in no acute distress [General Appearance - Well Nourished] : well nourished [General Appearance - Alert] : alert [General Appearance - Well-Appearing] : healthy appearing [General Appearance - Well Developed] : well developed [Sclera] : the sclera and conjunctiva were normal [Outer Ear] : the ears and nose were normal in appearance [Examination Of The Oral Cavity] : the lips and gums were normal [Neck Appearance] : the appearance of the neck was normal [Nasal Cavity] : the nasal mucosa and septum were normal [Respiration, Rhythm And Depth] : normal respiratory rhythm and effort [Auscultation Breath Sounds / Voice Sounds] : lungs were clear to auscultation bilaterally [Heart Sounds] : normal S1 and S2 [Heart Rate And Rhythm] : heart rate was normal and rhythm regular [Cervical Lymph Nodes Enlarged Posterior Bilaterally] : posterior cervical [Edema] : there was no peripheral edema [Bowel Sounds] : normal bowel sounds [Cervical Lymph Nodes Enlarged Anterior Bilaterally] : anterior cervical [Axillary Lymph Nodes Enlarged Bilaterally] : axillary [No Spinal Tenderness] : no spinal tenderness [Musculoskeletal - Swelling] : no joint swelling seen [Sensation] : the sensory exam was normal to light touch and pinprick [Motor Exam] : the motor exam was normal [Oriented To Time, Place, And Person] : oriented to person, place, and time [FreeTextEntry1] : 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

## 2020-02-27 NOTE — HISTORY OF PRESENT ILLNESS
[Skin Lesions] : skin lesions [Dry Eyes] : dry eyes [Skin Nodules] : skin nodules [Anorexia] : no anorexia [FreeTextEntry1] : 54 year old female with livedo rash and pANCA + 1:160, negative MPO and PR3\par \par Developed this rash about 6mo ago, livedo reticularis. \par Dermatology got a skin biopsy - normal. Looks like they biopsied the erythematous part. \par Gastroenterology evaluation of epigastric pain and diarrhea, Colonoscopy nml.\par \par Has a history of sinus infections\par Deviated septum fixed early 2000s, complicated by difficulty breathing through her nose. Was re-evaluated by ENT. 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 We have no pathology on file. She still has ongoing sinus congestion and uses a lot of sprays. \par \par No ear infections \par No photosensitive rashes\par \par Dry eyes, uses artificial tears. No history of inflammatory eye disease\par No dry mouth.\par \par No history of asthma, no SOB or cough, no wheeze. Smoker, trying to quit. \par No hemoptysis\par No history of kidney problems, no history of proteinuria or hematuria. Denies any urinary changes of late.\par \par No dysphagia/odynophagia \par \par Possible history of subcutaneous nodules? Had a "lump" in her inner upper L arm that subsequently resolved. [Weight Loss] : no weight loss [Malaise] : no malaise [Fever] : no fever [Fatigue] : no fatigue [Chills] : no chills [Depression] : no depression [Malar Facial Rash] : no malar facial rash [Oral Ulcers] : no oral ulcers [Dry Mouth] : no dry mouth [Cough] : no cough [Shortness of Breath] : no shortness of breath [Arthralgias] : no arthralgias [Chest Pain] : no chest pain [Joint Warmth] : no joint warmth [Joint Swelling] : no joint swelling [Decreased ROM] : no decreased range of motion [Joint Deformity] : no joint deformity [Morning Stiffness] : no morning stiffness [Falls] : no falls [Difficulty Walking] : no difficulty walking [Difficulty Standing] : no difficulty standing [Dyspnea] : no dyspnea [Muscle Weakness] : no muscle weakness [Myalgias] : no myalgias [Muscle Cramping] : no muscle cramping [Muscle Spasms] : no muscle spasms [Visual Changes] : no visual changes [Eye Redness] : no eye redness

## 2020-02-28 ENCOUNTER — APPOINTMENT (OUTPATIENT)
Dept: PSYCHIATRY | Facility: CLINIC | Age: 55
End: 2020-02-28

## 2020-02-29 ENCOUNTER — NON-APPOINTMENT (OUTPATIENT)
Age: 55
End: 2020-02-29

## 2020-03-02 LAB
24R-OH-CALCIDIOL SERPL-MCNC: 41.8 PG/ML
25(OH)D3 SERPL-MCNC: 16 NG/ML
ACE BLD-CCNC: 35 U/L
ALBUMIN SERPL ELPH-MCNC: 4.5 G/DL
ALP BLD-CCNC: 72 U/L
ALT SERPL-CCNC: 14 U/L
ANA SER IF-ACNC: NEGATIVE
ANION GAP SERPL CALC-SCNC: 14 MMOL/L
APPEARANCE: CLEAR
AST SERPL-CCNC: 17 U/L
B2 GLYCOPROT1 IGA SERPL IA-ACNC: 5.6 SAU
B2 GLYCOPROT1 IGG SER-ACNC: <5 SGU
B2 GLYCOPROT1 IGM SER-ACNC: <5 SMU
BACTERIA: NEGATIVE
BASOPHILS # BLD AUTO: 0.03 K/UL
BASOPHILS NFR BLD AUTO: 0.5 %
BILIRUB SERPL-MCNC: 0.2 MG/DL
BILIRUBIN URINE: NEGATIVE
BLOOD URINE: NORMAL
BUN SERPL-MCNC: 14 MG/DL
CALCIUM SERPL-MCNC: 9.5 MG/DL
CARDIOLIPIN IGM SER-MCNC: 5.6 MPL
CARDIOLIPIN IGM SER-MCNC: <5 GPL
CCP AB SER IA-ACNC: <8 UNITS
CHLORIDE SERPL-SCNC: 106 MMOL/L
CO2 SERPL-SCNC: 22 MMOL/L
COLOR: NORMAL
CONFIRM: 31.7 SEC
CREAT SERPL-MCNC: 0.83 MG/DL
CREAT SPEC-SCNC: 45 MG/DL
CREAT/PROT UR: 0.1 RATIO
CRP SERPL-MCNC: 0.37 MG/DL
DEPRECATED CARDIOLIPIN IGA SER: <5 APL
DRVVT IMM 1:2 NP PPP: NORMAL
DRVVT SCREEN TO CONFIRM RATIO: 0.97 RATIO
EOSINOPHIL # BLD AUTO: 0.19 K/UL
EOSINOPHIL NFR BLD AUTO: 3.1 %
ERYTHROCYTE [SEDIMENTATION RATE] IN BLOOD BY WESTERGREN METHOD: 25 MM/HR
FERRITIN SERPL-MCNC: 61 NG/ML
GLUCOSE QUALITATIVE U: NEGATIVE
GLUCOSE SERPL-MCNC: 87 MG/DL
HBV CORE IGG+IGM SER QL: NONREACTIVE
HBV SURFACE AB SER QL: REACTIVE
HBV SURFACE AG SER QL: NONREACTIVE
HCT VFR BLD CALC: 45.8 %
HCV AB SER QL: NONREACTIVE
HCV S/CO RATIO: 0.14 S/CO
HGB BLD-MCNC: 14.5 G/DL
HYALINE CASTS: 0 /LPF
IGA SER QL IEP: 146 MG/DL
IGG SER QL IEP: 997 MG/DL
IGM SER QL IEP: 64 MG/DL
IMM GRANULOCYTES NFR BLD AUTO: 0.2 %
KETONES URINE: NEGATIVE
LEUKOCYTE ESTERASE URINE: NEGATIVE
LYMPHOCYTES # BLD AUTO: 1.67 K/UL
LYMPHOCYTES NFR BLD AUTO: 27.3 %
MAN DIFF?: NORMAL
MCHC RBC-ENTMCNC: 30.5 PG
MCHC RBC-ENTMCNC: 31.7 GM/DL
MCV RBC AUTO: 96.4 FL
MICROSCOPIC-UA: NORMAL
MONOCYTES # BLD AUTO: 0.34 K/UL
MONOCYTES NFR BLD AUTO: 5.6 %
MPO AB + PR3 PNL SER: NORMAL
MYELOPEROXIDASE AB SER QL IA: <5 UNITS
MYELOPEROXIDASE CELLS FLD QL: NEGATIVE
NEUTROPHILS # BLD AUTO: 3.87 K/UL
NEUTROPHILS NFR BLD AUTO: 63.3 %
NITRITE URINE: NEGATIVE
PH URINE: 6.5
PLATELET # BLD AUTO: 199 K/UL
POTASSIUM SERPL-SCNC: 4.5 MMOL/L
PROT SERPL-MCNC: 7 G/DL
PROT UR-MCNC: 5 MG/DL
PROTEIN URINE: NEGATIVE
PROTEINASE3 AB SER IA-ACNC: 17.2 UNITS
PROTEINASE3 AB SER-ACNC: NEGATIVE
PS IGA SER QL: <20 U/ML
PS IGG SER QL: <10 U/ML
PS IGM SER QL: <25 U/ML
RBC # BLD: 4.75 M/UL
RBC # FLD: 13.7 %
RED BLOOD CELLS URINE: 6 /HPF
RF+CCP IGG SER-IMP: NEGATIVE
RHEUMATOID FACT SER QL: <10 IU/ML
SCREEN DRVVT: 34.3 SEC
SILICA CLOTTING TIME INTERPRETATION: NORMAL
SILICA CLOTTING TIME S/C: 1.01 RATIO
SODIUM SERPL-SCNC: 141 MMOL/L
SPECIFIC GRAVITY URINE: 1.01
SQUAMOUS EPITHELIAL CELLS: 1 /HPF
UROBILINOGEN URINE: NORMAL
WBC # FLD AUTO: 6.11 K/UL
WHITE BLOOD CELLS URINE: 0 /HPF

## 2020-03-05 ENCOUNTER — APPOINTMENT (OUTPATIENT)
Dept: PSYCHIATRY | Facility: CLINIC | Age: 55
End: 2020-03-05

## 2020-03-06 ENCOUNTER — APPOINTMENT (OUTPATIENT)
Dept: PSYCHIATRY | Facility: CLINIC | Age: 55
End: 2020-03-06

## 2020-03-13 ENCOUNTER — APPOINTMENT (OUTPATIENT)
Dept: PSYCHIATRY | Facility: CLINIC | Age: 55
End: 2020-03-13

## 2020-03-19 ENCOUNTER — APPOINTMENT (OUTPATIENT)
Dept: PSYCHIATRY | Facility: CLINIC | Age: 55
End: 2020-03-19

## 2020-03-20 ENCOUNTER — APPOINTMENT (OUTPATIENT)
Dept: PSYCHIATRY | Facility: CLINIC | Age: 55
End: 2020-03-20

## 2020-03-27 ENCOUNTER — APPOINTMENT (OUTPATIENT)
Dept: PSYCHIATRY | Facility: CLINIC | Age: 55
End: 2020-03-27

## 2020-04-03 ENCOUNTER — APPOINTMENT (OUTPATIENT)
Dept: PSYCHIATRY | Facility: CLINIC | Age: 55
End: 2020-04-03

## 2020-04-10 ENCOUNTER — APPOINTMENT (OUTPATIENT)
Dept: PSYCHIATRY | Facility: CLINIC | Age: 55
End: 2020-04-10

## 2020-04-16 ENCOUNTER — APPOINTMENT (OUTPATIENT)
Dept: PSYCHIATRY | Facility: CLINIC | Age: 55
End: 2020-04-16

## 2020-04-16 ENCOUNTER — TRANSCRIPTION ENCOUNTER (OUTPATIENT)
Age: 55
End: 2020-04-16

## 2020-04-16 RX ORDER — GABAPENTIN 300 MG/1
300 CAPSULE ORAL
Qty: 270 | Refills: 0 | Status: DISCONTINUED | COMMUNITY
Start: 2020-01-16 | End: 2020-04-16

## 2020-04-17 ENCOUNTER — APPOINTMENT (OUTPATIENT)
Dept: PSYCHIATRY | Facility: CLINIC | Age: 55
End: 2020-04-17

## 2020-04-24 ENCOUNTER — APPOINTMENT (OUTPATIENT)
Dept: PSYCHIATRY | Facility: CLINIC | Age: 55
End: 2020-04-24

## 2020-04-27 ENCOUNTER — APPOINTMENT (OUTPATIENT)
Dept: GERIATRICS | Facility: CLINIC | Age: 55
End: 2020-04-27

## 2020-04-29 RX ORDER — NYSTATIN 100000 U/G
100000 OINTMENT TOPICAL 3 TIMES DAILY
Qty: 1 | Refills: 3 | Status: ACTIVE | COMMUNITY
Start: 2020-01-14 | End: 1900-01-01

## 2020-04-30 ENCOUNTER — APPOINTMENT (OUTPATIENT)
Dept: INTERNAL MEDICINE | Facility: CLINIC | Age: 55
End: 2020-04-30
Payer: MEDICAID

## 2020-04-30 DIAGNOSIS — E03.9 HYPOTHYROIDISM, UNSPECIFIED: ICD-10-CM

## 2020-04-30 DIAGNOSIS — J21.9 ACUTE BRONCHIOLITIS, UNSPECIFIED: ICD-10-CM

## 2020-04-30 DIAGNOSIS — Z78.9 OTHER SPECIFIED HEALTH STATUS: ICD-10-CM

## 2020-04-30 DIAGNOSIS — R19.00 INTRA-ABDOMINAL AND PELVIC SWELLING, MASS AND LUMP, UNSPECIFIED SITE: ICD-10-CM

## 2020-04-30 DIAGNOSIS — M54.16 RADICULOPATHY, LUMBAR REGION: ICD-10-CM

## 2020-04-30 PROCEDURE — 99442: CPT

## 2020-05-01 ENCOUNTER — APPOINTMENT (OUTPATIENT)
Dept: PSYCHIATRY | Facility: CLINIC | Age: 55
End: 2020-05-01

## 2020-05-07 ENCOUNTER — APPOINTMENT (OUTPATIENT)
Dept: INTERNAL MEDICINE | Facility: CLINIC | Age: 55
End: 2020-05-07

## 2020-05-08 ENCOUNTER — APPOINTMENT (OUTPATIENT)
Dept: PSYCHIATRY | Facility: CLINIC | Age: 55
End: 2020-05-08

## 2020-05-13 ENCOUNTER — APPOINTMENT (OUTPATIENT)
Dept: INTERNAL MEDICINE | Facility: CLINIC | Age: 55
End: 2020-05-13
Payer: MEDICAID

## 2020-05-13 ENCOUNTER — APPOINTMENT (OUTPATIENT)
Dept: INTERNAL MEDICINE | Facility: CLINIC | Age: 55
End: 2020-05-13

## 2020-05-13 PROCEDURE — 99442: CPT

## 2020-05-13 RX ORDER — SUMATRIPTAN 50 MG/1
50 TABLET, FILM COATED ORAL
Qty: 9 | Refills: 0 | Status: ACTIVE | COMMUNITY
Start: 2020-01-28 | End: 1900-01-01

## 2020-05-14 ENCOUNTER — APPOINTMENT (OUTPATIENT)
Dept: PSYCHIATRY | Facility: CLINIC | Age: 55
End: 2020-05-14

## 2020-05-15 ENCOUNTER — APPOINTMENT (OUTPATIENT)
Dept: PSYCHIATRY | Facility: CLINIC | Age: 55
End: 2020-05-15

## 2020-05-19 DIAGNOSIS — L03.011 CELLULITIS OF RIGHT FINGER: ICD-10-CM

## 2020-05-19 RX ORDER — CEPHALEXIN 500 MG/1
500 CAPSULE ORAL 3 TIMES DAILY
Qty: 21 | Refills: 0 | Status: ACTIVE | COMMUNITY
Start: 2020-05-19 | End: 1900-01-01

## 2020-05-21 ENCOUNTER — APPOINTMENT (OUTPATIENT)
Dept: PSYCHIATRY | Facility: CLINIC | Age: 55
End: 2020-05-21

## 2020-05-22 ENCOUNTER — APPOINTMENT (OUTPATIENT)
Dept: PSYCHIATRY | Facility: CLINIC | Age: 55
End: 2020-05-22

## 2020-05-27 ENCOUNTER — APPOINTMENT (OUTPATIENT)
Dept: INTERNAL MEDICINE | Facility: CLINIC | Age: 55
End: 2020-05-27
Payer: MEDICAID

## 2020-05-27 DIAGNOSIS — F32.2 MAJOR DEPRESSIVE DISORDER, SINGLE EPISODE, SEVERE W/OUT PSYCHOTIC FEATURES: ICD-10-CM

## 2020-05-27 DIAGNOSIS — G43.909 MIGRAINE, UNSPECIFIED, NOT INTRACTABLE, W/OUT STATUS MIGRAINOSUS: ICD-10-CM

## 2020-05-27 PROCEDURE — 99442: CPT

## 2020-05-27 RX ORDER — TRAZODONE HYDROCHLORIDE 100 MG/1
100 TABLET ORAL
Refills: 0 | Status: ACTIVE | COMMUNITY
Start: 2020-01-14

## 2020-05-28 ENCOUNTER — APPOINTMENT (OUTPATIENT)
Dept: PSYCHIATRY | Facility: CLINIC | Age: 55
End: 2020-05-28

## 2020-05-28 PROCEDURE — 90834 PSYTX W PT 45 MINUTES: CPT | Mod: 95

## 2020-05-29 ENCOUNTER — APPOINTMENT (OUTPATIENT)
Dept: PSYCHIATRY | Facility: CLINIC | Age: 55
End: 2020-05-29

## 2020-06-02 ENCOUNTER — APPOINTMENT (OUTPATIENT)
Dept: PSYCHIATRY | Facility: CLINIC | Age: 55
End: 2020-06-02

## 2020-06-02 PROCEDURE — 90834 PSYTX W PT 45 MINUTES: CPT | Mod: 95

## 2020-06-02 RX ORDER — PREGABALIN 300 MG/1
300 CAPSULE ORAL
Qty: 60 | Refills: 1 | Status: ACTIVE | COMMUNITY
Start: 2020-04-16 | End: 1900-01-01

## 2020-06-04 ENCOUNTER — APPOINTMENT (OUTPATIENT)
Dept: PSYCHIATRY | Facility: CLINIC | Age: 55
End: 2020-06-04

## 2020-06-09 ENCOUNTER — APPOINTMENT (OUTPATIENT)
Dept: INTERNAL MEDICINE | Facility: CLINIC | Age: 55
End: 2020-06-09
Payer: MEDICAID

## 2020-06-09 DIAGNOSIS — R23.1 PALLOR: ICD-10-CM

## 2020-06-09 PROCEDURE — 99443: CPT | Mod: GC

## 2020-06-09 RX ORDER — VARENICLINE TARTRATE 1 MG/1
1 TABLET, FILM COATED ORAL TWICE DAILY
Qty: 1 | Refills: 1 | Status: ACTIVE | COMMUNITY
Start: 2020-01-28 | End: 1900-01-01

## 2020-06-17 ENCOUNTER — APPOINTMENT (OUTPATIENT)
Dept: PSYCHIATRY | Facility: CLINIC | Age: 55
End: 2020-06-17

## 2020-06-18 ENCOUNTER — APPOINTMENT (OUTPATIENT)
Dept: PSYCHIATRY | Facility: CLINIC | Age: 55
End: 2020-06-18

## 2020-07-02 ENCOUNTER — APPOINTMENT (OUTPATIENT)
Dept: PSYCHIATRY | Facility: CLINIC | Age: 55
End: 2020-07-02

## 2020-07-13 ENCOUNTER — APPOINTMENT (OUTPATIENT)
Dept: INTERNAL MEDICINE | Facility: CLINIC | Age: 55
End: 2020-07-13
Payer: MEDICAID

## 2020-07-13 DIAGNOSIS — E55.9 VITAMIN D DEFICIENCY, UNSPECIFIED: ICD-10-CM

## 2020-07-13 DIAGNOSIS — M54.9 DORSALGIA, UNSPECIFIED: ICD-10-CM

## 2020-07-13 PROCEDURE — 99443: CPT | Mod: GC

## 2020-07-13 RX ORDER — DICLOFENAC SODIUM 10 MG/G
1 GEL TOPICAL DAILY
Qty: 1 | Refills: 0 | Status: ACTIVE | COMMUNITY
Start: 2020-07-13 | End: 1900-01-01

## 2020-07-13 RX ORDER — LEVOTHYROXINE SODIUM 0.12 MG/1
125 TABLET ORAL DAILY
Qty: 30 | Refills: 2 | Status: ACTIVE | COMMUNITY
Start: 2020-07-13 | End: 1900-01-01

## 2020-07-13 RX ORDER — VARENICLINE TARTRATE 1 MG/1
1 TABLET, FILM COATED ORAL
Qty: 1 | Refills: 0 | Status: ACTIVE | COMMUNITY
Start: 2020-07-13 | End: 1900-01-01

## 2020-07-16 ENCOUNTER — APPOINTMENT (OUTPATIENT)
Dept: PSYCHIATRY | Facility: CLINIC | Age: 55
End: 2020-07-16

## 2020-07-30 ENCOUNTER — APPOINTMENT (OUTPATIENT)
Dept: PSYCHIATRY | Facility: CLINIC | Age: 55
End: 2020-07-30

## 2020-08-19 ENCOUNTER — APPOINTMENT (OUTPATIENT)
Dept: INTERNAL MEDICINE | Facility: CLINIC | Age: 55
End: 2020-08-19
Payer: MEDICAID

## 2020-08-19 DIAGNOSIS — R23.2 FLUSHING: ICD-10-CM

## 2020-08-19 DIAGNOSIS — R23.1 PALLOR: ICD-10-CM

## 2020-08-19 DIAGNOSIS — R31.29 OTHER MICROSCOPIC HEMATURIA: ICD-10-CM

## 2020-08-19 DIAGNOSIS — F17.200 NICOTINE DEPENDENCE, UNSPECIFIED, UNCOMPLICATED: ICD-10-CM

## 2020-08-19 DIAGNOSIS — R92.8 OTHER ABNORMAL AND INCONCLUSIVE FINDINGS ON DIAGNOSTIC IMAGING OF BREAST: ICD-10-CM

## 2020-08-19 PROCEDURE — 99442: CPT | Mod: GC

## 2020-08-19 RX ORDER — LORATADINE 5 MG/5 ML
10 SOLUTION, ORAL ORAL
Qty: 30 | Refills: 3 | Status: DISCONTINUED | COMMUNITY
Start: 2020-06-02 | End: 2020-08-19

## 2020-08-19 RX ORDER — ATORVASTATIN CALCIUM 10 MG/1
10 TABLET, FILM COATED ORAL
Qty: 90 | Refills: 1 | Status: DISCONTINUED | COMMUNITY
End: 2020-08-19

## 2020-08-19 NOTE — HISTORY OF PRESENT ILLNESS
[Home] : at home, [unfilled] , at the time of the visit. [Other Location: e.g. Home (Enter Location, City,State)___] : at [unfilled] [Verbal consent obtained from patient] : the patient, [unfilled] [Rash (Location) ___] : rash on [unfilled] [___ Days ago] : [unfilled] days ago [FreeTextEntry2] : tender to palpation [FreeTextEntry8] : EMANI HORAN, an established patient at this office, reached out to this provider for [ a telephonic visit ]. No recent visit with the last 7 days. A visit is not scheduled within the next 7 days at this time.\par Discussed with patient: You have chosen to receive care through the use of tele-media. Tele-media enables health care\par providers at different locations to provide safe, effective and convenient care through the use of technology. Please note that\par this is a billable encounter. As with any health care service, there are risks associated with the use of tele-media, including\par equipment failure, poor image and/or resolution, and  issues. You understand that I cannot physically\par examine you and that you may need to come to the clinic to complete the assessment. Patient agreed verbally to\par understanding the risks and benefits of tele-media as explained. All questions regarding tele-media encounters were\par answered.\par Total time spent with the patient on the phone was [45 minutes].\par \par 54F PMH anxiety/MDD, hypothyroidism and hot flashes who presents to clinic via telephone interview for complaint of rash on her backside. The patient states she first noticed the rash yesterday because the area was tender to palpation. She states that as she cannot see it on herself, she took a picture and that the area appears "red and looks like a dry blister". Patient states that it does not bother her unless she touches it, but she does not like the way it looks. She has cleaned the area with an alcohol swab and begun using previously prescribed nystatin cream on the area. Patient states that she has recently moved to [place] and does not have a dermatologist in the area.\par Patient reports that she is no longer having hot flashes after being tapered off her high dose of Effexor by her psychiatrist. She has also stopped needing trazodone for sleep, last dose 3 weeks ago. She reports that she does not like medications and that she is no longer on lyrica, gabapentin, or voltaren gel. She feels much less anxious after moving and states that she is more active and losing weight which is resulting in improvement in her back pain. Patient continues to smoke but is down to 10 "Liyah 100s" cigarettes a day. She states that she is not ready to quit yet and does have the Chantix prescription, but will not start taking it until she is ready to quit.

## 2020-08-19 NOTE — REVIEW OF SYSTEMS
[Skin Rash] : skin rash [Negative] : Heme/Lymph [de-identified] : patient reports rash on gluteal region [FreeTextEntry2] : p

## 2020-08-19 NOTE — END OF VISIT
[] : Resident [FreeTextEntry3] : Patient was unable to complete visit at scheduled time and asked for call back in 30min however, our schedule did not allow a call back till 430pm at which time I left voice message for patient advising to address additional concern through video or in-office visit rather than email. also, included in message I am out of offiice till Monday.  Patient has dermatologist as well to consider for skin concern.\par \par UA less than 10 rbc therefore less likely significant and will repeat at in-office appointment or advise in next discussion to have repeated

## 2020-08-19 NOTE — ASSESSMENT
[FreeTextEntry1] : 54F PMH anxiety/MDD, hypothyroidism, hot flashes, and abnormal mammogram findings presents with rash on gluteal region.\par \par #Gluteal Rash\par Patient with tender, erythematous rash on gluteal region which she first noticed 1 day ago\par - recommended patient consider a video telemedicine visit\par - recommended patient keep the area clean and dry\par - consider dermatology referral given patient has history of Livedo reticularis and inability to visualize rash in setting of telephonic visit\par \par #Abnormal mammogram\par Patient with abnormal mammogram in February, lost to follow up due to COVID-19 related imaging center closures. \par - Ordered repeat imaging today, diagnostic mamogram bilateral and breast ultrasound bilateral\par - Follow up with imaging\par - F/u Gyn recs\par \par #Hot flashes: RESOLVED\par Patient with persistent, presumed menopause related, hot flashes, failed trial of max dose gabapentin and max dose pregabalin. Patient was unwilling to use hormone based medications.\par - Effexor was titrated off by psychiatrist and no longer requiring trazodone for sleep resulting in resolution of symptoms\par \par #Hematuria\par Patient with microscopic hematuria on last urinalysis with her Rheumatologist, and last menstrual period is reported to be in 2016\par - Repeat urinalysis was ordered at last visit, but patient has moved to [place] and does not wish to return to [facility] for labs\par - recommend for patient to visit a lab nearby that is covered by her insurance\par - if still having microscopic hematuria, will refer to urology\par \par #Back pain\par Patient has history of chronic mild back pain controlled with Voltaren gel and TENs device\par - patient states that her back pain has resolved after losing weight by increasing activity\par \par #Hypothyroidism\par - Continue levothyroxine 125 mcg/day\par \par #Anxiety/MDD\par - Patient has been tapered off her high dose Effexor by her psychiatrist and reports that she has been doing well off the medication\par \par #HCM\par - Patient will benefit from obtaining a new PCP close to her new home as care that can be provided through telephonic visits is limited and patient would like to avoid travel to [place] given her history of anxiety and the current pandemic.

## 2020-08-19 NOTE — PHYSICAL EXAM
[Normal Voice/Communication] : normal voice/communication [No Respiratory Distress] : no respiratory distress  [Normal] : affect was normal and insight and judgment were intact [de-identified] : p

## 2020-08-27 ENCOUNTER — APPOINTMENT (OUTPATIENT)
Dept: PSYCHIATRY | Facility: CLINIC | Age: 55
End: 2020-08-27

## 2020-09-10 ENCOUNTER — APPOINTMENT (OUTPATIENT)
Dept: PSYCHIATRY | Facility: CLINIC | Age: 55
End: 2020-09-10

## 2020-09-13 ENCOUNTER — EMERGENCY (EMERGENCY)
Facility: HOSPITAL | Age: 55
LOS: 0 days | Discharge: PSYCHIATRIC OR SUBSTANCE USE FACILITY | End: 2020-09-13
Attending: EMERGENCY MEDICINE | Admitting: PSYCHIATRY & NEUROLOGY
Payer: MEDICAID

## 2020-09-13 VITALS
WEIGHT: 220.02 LBS | DIASTOLIC BLOOD PRESSURE: 55 MMHG | HEART RATE: 71 BPM | HEIGHT: 64 IN | SYSTOLIC BLOOD PRESSURE: 102 MMHG | RESPIRATION RATE: 18 BRPM | TEMPERATURE: 100 F | OXYGEN SATURATION: 100 %

## 2020-09-13 VITALS
SYSTOLIC BLOOD PRESSURE: 116 MMHG | DIASTOLIC BLOOD PRESSURE: 60 MMHG | TEMPERATURE: 98 F | HEART RATE: 77 BPM | RESPIRATION RATE: 18 BRPM | OXYGEN SATURATION: 96 %

## 2020-09-13 DIAGNOSIS — K14.9 DISEASE OF TONGUE, UNSPECIFIED: Chronic | ICD-10-CM

## 2020-09-13 DIAGNOSIS — Z98.890 OTHER SPECIFIED POSTPROCEDURAL STATES: Chronic | ICD-10-CM

## 2020-09-13 DIAGNOSIS — R07.89 OTHER CHEST PAIN: ICD-10-CM

## 2020-09-13 DIAGNOSIS — Z98.51 TUBAL LIGATION STATUS: Chronic | ICD-10-CM

## 2020-09-13 DIAGNOSIS — R45.851 SUICIDAL IDEATIONS: ICD-10-CM

## 2020-09-13 DIAGNOSIS — Z87.39 PERSONAL HISTORY OF OTHER DISEASES OF THE MUSCULOSKELETAL SYSTEM AND CONNECTIVE TISSUE: Chronic | ICD-10-CM

## 2020-09-13 DIAGNOSIS — F48.9 NONPSYCHOTIC MENTAL DISORDER, UNSPECIFIED: ICD-10-CM

## 2020-09-13 DIAGNOSIS — Z98.891 HISTORY OF UTERINE SCAR FROM PREVIOUS SURGERY: Chronic | ICD-10-CM

## 2020-09-13 DIAGNOSIS — Z98.82 BREAST IMPLANT STATUS: Chronic | ICD-10-CM

## 2020-09-13 DIAGNOSIS — Z20.828 CONTACT WITH AND (SUSPECTED) EXPOSURE TO OTHER VIRAL COMMUNICABLE DISEASES: ICD-10-CM

## 2020-09-13 DIAGNOSIS — F31.60 BIPOLAR DISORDER, CURRENT EPISODE MIXED, UNSPECIFIED: ICD-10-CM

## 2020-09-13 DIAGNOSIS — Z90.49 ACQUIRED ABSENCE OF OTHER SPECIFIED PARTS OF DIGESTIVE TRACT: Chronic | ICD-10-CM

## 2020-09-13 LAB
ALBUMIN SERPL ELPH-MCNC: 3.6 G/DL — SIGNIFICANT CHANGE UP (ref 3.3–5)
ALP SERPL-CCNC: 62 U/L — SIGNIFICANT CHANGE UP (ref 40–120)
ALT FLD-CCNC: 38 U/L — SIGNIFICANT CHANGE UP (ref 12–78)
ANION GAP SERPL CALC-SCNC: 3 MMOL/L — LOW (ref 5–17)
APAP SERPL-MCNC: <2 UG/ML — LOW (ref 10–30)
APPEARANCE UR: CLEAR — SIGNIFICANT CHANGE UP
AST SERPL-CCNC: 44 U/L — HIGH (ref 15–37)
BASOPHILS # BLD AUTO: 0.03 K/UL — SIGNIFICANT CHANGE UP (ref 0–0.2)
BASOPHILS NFR BLD AUTO: 0.3 % — SIGNIFICANT CHANGE UP (ref 0–2)
BILIRUB SERPL-MCNC: 0.5 MG/DL — SIGNIFICANT CHANGE UP (ref 0.2–1.2)
BILIRUB UR-MCNC: NEGATIVE — SIGNIFICANT CHANGE UP
BUN SERPL-MCNC: 18 MG/DL — SIGNIFICANT CHANGE UP (ref 7–23)
CALCIUM SERPL-MCNC: 9 MG/DL — SIGNIFICANT CHANGE UP (ref 8.5–10.1)
CHLORIDE SERPL-SCNC: 112 MMOL/L — HIGH (ref 96–108)
CO2 SERPL-SCNC: 27 MMOL/L — SIGNIFICANT CHANGE UP (ref 22–31)
COLOR SPEC: YELLOW — SIGNIFICANT CHANGE UP
CREAT SERPL-MCNC: 0.77 MG/DL — SIGNIFICANT CHANGE UP (ref 0.5–1.3)
DIFF PNL FLD: ABNORMAL
EOSINOPHIL # BLD AUTO: 0.2 K/UL — SIGNIFICANT CHANGE UP (ref 0–0.5)
EOSINOPHIL NFR BLD AUTO: 2.3 % — SIGNIFICANT CHANGE UP (ref 0–6)
ETHANOL SERPL-MCNC: <10 MG/DL — SIGNIFICANT CHANGE UP (ref 0–10)
GLUCOSE SERPL-MCNC: 97 MG/DL — SIGNIFICANT CHANGE UP (ref 70–99)
GLUCOSE UR QL: NEGATIVE MG/DL — SIGNIFICANT CHANGE UP
HCT VFR BLD CALC: 42.3 % — SIGNIFICANT CHANGE UP (ref 34.5–45)
HGB BLD-MCNC: 13.6 G/DL — SIGNIFICANT CHANGE UP (ref 11.5–15.5)
IMM GRANULOCYTES NFR BLD AUTO: 0.2 % — SIGNIFICANT CHANGE UP (ref 0–1.5)
KETONES UR-MCNC: NEGATIVE — SIGNIFICANT CHANGE UP
LEUKOCYTE ESTERASE UR-ACNC: NEGATIVE — SIGNIFICANT CHANGE UP
LITHIUM SERPL-MCNC: 0.3 MMOL/L — LOW (ref 0.6–1.2)
LYMPHOCYTES # BLD AUTO: 1.59 K/UL — SIGNIFICANT CHANGE UP (ref 1–3.3)
LYMPHOCYTES # BLD AUTO: 18.4 % — SIGNIFICANT CHANGE UP (ref 13–44)
MCHC RBC-ENTMCNC: 30.2 PG — SIGNIFICANT CHANGE UP (ref 27–34)
MCHC RBC-ENTMCNC: 32.2 GM/DL — SIGNIFICANT CHANGE UP (ref 32–36)
MCV RBC AUTO: 93.8 FL — SIGNIFICANT CHANGE UP (ref 80–100)
MONOCYTES # BLD AUTO: 0.5 K/UL — SIGNIFICANT CHANGE UP (ref 0–0.9)
MONOCYTES NFR BLD AUTO: 5.8 % — SIGNIFICANT CHANGE UP (ref 2–14)
NEUTROPHILS # BLD AUTO: 6.28 K/UL — SIGNIFICANT CHANGE UP (ref 1.8–7.4)
NEUTROPHILS NFR BLD AUTO: 73 % — SIGNIFICANT CHANGE UP (ref 43–77)
NITRITE UR-MCNC: NEGATIVE — SIGNIFICANT CHANGE UP
PCP SPEC-MCNC: SIGNIFICANT CHANGE UP
PH UR: 7 — SIGNIFICANT CHANGE UP (ref 5–8)
PLATELET # BLD AUTO: 196 K/UL — SIGNIFICANT CHANGE UP (ref 150–400)
POTASSIUM SERPL-MCNC: 3.8 MMOL/L — SIGNIFICANT CHANGE UP (ref 3.5–5.3)
POTASSIUM SERPL-SCNC: 3.8 MMOL/L — SIGNIFICANT CHANGE UP (ref 3.5–5.3)
PROT SERPL-MCNC: 7.2 GM/DL — SIGNIFICANT CHANGE UP (ref 6–8.3)
PROT UR-MCNC: NEGATIVE MG/DL — SIGNIFICANT CHANGE UP
RBC # BLD: 4.51 M/UL — SIGNIFICANT CHANGE UP (ref 3.8–5.2)
RBC # FLD: 13.8 % — SIGNIFICANT CHANGE UP (ref 10.3–14.5)
SALICYLATES SERPL-MCNC: <1.7 MG/DL — LOW (ref 2.8–20)
SARS-COV-2 RNA SPEC QL NAA+PROBE: SIGNIFICANT CHANGE UP
SODIUM SERPL-SCNC: 142 MMOL/L — SIGNIFICANT CHANGE UP (ref 135–145)
SP GR SPEC: 1.01 — SIGNIFICANT CHANGE UP (ref 1.01–1.02)
TSH SERPL-MCNC: 2.57 UU/ML — SIGNIFICANT CHANGE UP (ref 0.34–4.82)
UROBILINOGEN FLD QL: NEGATIVE MG/DL — SIGNIFICANT CHANGE UP
WBC # BLD: 8.62 K/UL — SIGNIFICANT CHANGE UP (ref 3.8–10.5)
WBC # FLD AUTO: 8.62 K/UL — SIGNIFICANT CHANGE UP (ref 3.8–10.5)

## 2020-09-13 PROCEDURE — 36415 COLL VENOUS BLD VENIPUNCTURE: CPT

## 2020-09-13 PROCEDURE — 93005 ELECTROCARDIOGRAM TRACING: CPT

## 2020-09-13 PROCEDURE — 99285 EMERGENCY DEPT VISIT HI MDM: CPT | Mod: 25

## 2020-09-13 PROCEDURE — 81001 URINALYSIS AUTO W/SCOPE: CPT

## 2020-09-13 PROCEDURE — 84443 ASSAY THYROID STIM HORMONE: CPT

## 2020-09-13 PROCEDURE — U0003: CPT

## 2020-09-13 PROCEDURE — 99285 EMERGENCY DEPT VISIT HI MDM: CPT

## 2020-09-13 PROCEDURE — 90792 PSYCH DIAG EVAL W/MED SRVCS: CPT | Mod: 95

## 2020-09-13 PROCEDURE — 85025 COMPLETE CBC W/AUTO DIFF WBC: CPT

## 2020-09-13 PROCEDURE — 80053 COMPREHEN METABOLIC PANEL: CPT

## 2020-09-13 PROCEDURE — 80178 ASSAY OF LITHIUM: CPT

## 2020-09-13 PROCEDURE — 80307 DRUG TEST PRSMV CHEM ANLYZR: CPT

## 2020-09-13 RX ORDER — ATORVASTATIN CALCIUM 80 MG/1
20 TABLET, FILM COATED ORAL AT BEDTIME
Refills: 0 | Status: DISCONTINUED | OUTPATIENT
Start: 2020-09-13 | End: 2020-09-13

## 2020-09-13 RX ORDER — LITHIUM CARBONATE 300 MG/1
900 TABLET, EXTENDED RELEASE ORAL AT BEDTIME
Refills: 0 | Status: DISCONTINUED | OUTPATIENT
Start: 2020-09-13 | End: 2020-09-13

## 2020-09-13 RX ORDER — LEVOTHYROXINE SODIUM 125 MCG
125 TABLET ORAL DAILY
Refills: 0 | Status: DISCONTINUED | OUTPATIENT
Start: 2020-09-13 | End: 2020-09-13

## 2020-09-13 RX ORDER — NICOTINE POLACRILEX 2 MG
2 GUM BUCCAL
Refills: 0 | Status: DISCONTINUED | OUTPATIENT
Start: 2020-09-13 | End: 2020-09-13

## 2020-09-13 RX ORDER — HALOPERIDOL DECANOATE 100 MG/ML
2 INJECTION INTRAMUSCULAR EVERY 6 HOURS
Refills: 0 | Status: DISCONTINUED | OUTPATIENT
Start: 2020-09-13 | End: 2020-09-13

## 2020-09-13 RX ORDER — HYDROXYZINE HCL 10 MG
50 TABLET ORAL ONCE
Refills: 0 | Status: COMPLETED | OUTPATIENT
Start: 2020-09-13 | End: 2020-09-13

## 2020-09-13 RX ADMIN — HALOPERIDOL DECANOATE 2 MILLIGRAM(S): 100 INJECTION INTRAMUSCULAR at 08:37

## 2020-09-13 RX ADMIN — Medication 50 MILLIGRAM(S): at 13:08

## 2020-09-13 NOTE — ED BEHAVIORAL HEALTH ASSESSMENT NOTE - DESCRIPTION (FIRST USE, LAST USE, QUANTITY, FREQUENCY, DURATION)
The patient reports that she smokes several cigarettes per day. The patient reports that she smokes cannabis whenever she can get her hands on it.

## 2020-09-13 NOTE — ED ADULT NURSE REASSESSMENT NOTE - NS ED NURSE REASSESS COMMENT FT1
Patient remains on 1:1 for safety. No signs of acute distress noted. Paperwork signed for voluntary psych admission.

## 2020-09-13 NOTE — ED BEHAVIORAL HEALTH ASSESSMENT NOTE - PRIMARY DX
Bipolar affective disorder, current episode mixed, current episode severity unspecified Deferred diagnosis on axis II

## 2020-09-13 NOTE — ED BEHAVIORAL HEALTH ASSESSMENT NOTE - DESCRIPTION
The patient is currently undomiciled and unemployed. She is single and refuses t discuss whether or not she has any children. Vital Signs Last 24 Hrs  T(C): 37.6 (13 Sep 2020 03:26), Max: 37.6 (13 Sep 2020 03:26)  T(F): 99.7 (13 Sep 2020 03:26), Max: 99.7 (13 Sep 2020 03:26)  HR: 71 (13 Sep 2020 03:26) (71 - 71)  BP: 102/55 (13 Sep 2020 03:26) (102/55 - 102/55)  BP(mean): --  RR: 18 (13 Sep 2020 03:26) (18 - 18)  SpO2: 100% (13 Sep 2020 03:26) (100% - 100%)    Please see the ED Behavioral Health Note dated 9/13/20 for a full ED course. Hypothyroidism and HLD

## 2020-09-13 NOTE — ED BEHAVIORAL HEALTH ASSESSMENT NOTE - PSYCHIATRIC ISSUES AND PLAN (INCLUDE STANDING AND PRN MEDICATION)
Will re-start the patient on lithium 900mg PO QHS and obtain an AM lithium level. Haldol PRNs as per [place].
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
## 2020-09-13 NOTE — ED ADULT NURSE REASSESSMENT NOTE - NS ED NURSE REASSESS COMMENT FT1
pt. noted resting comfortably in stretcher, no distress noted. denies pain/discomfort. safety maintained. WCTM. 1:1 for safety, tele psych conference in progress.

## 2020-09-13 NOTE — ED PROVIDER NOTE - PMH
Asthma  resolved, on no med  Back pain    Hyperlipidemia    Hypothyroid    Mood disorder    Morbid obesity    Smoker unmotivated to quit

## 2020-09-13 NOTE — ED ADULT NURSE REASSESSMENT NOTE - NS ED NURSE REASSESS COMMENT FT1
Report given to [facility]. Patient to be transported to [facility] within the hour.

## 2020-09-13 NOTE — ED BEHAVIORAL HEALTH NOTE - BEHAVIORAL HEALTH NOTE
===================  PRE-HOSPITAL COURSE  ===================  SOURCE:  RN and triage documentation.   DETAILS:  Patient was BIBPD; chief complaint of depression/SI.      ============  ED COURSE   ============  SOURCE:  RN and triage documentation.   ARRIVAL:  Patient was cooperative with triage process without incident. Patient was gowned and wanded. Patient presents with good hygiene/grooming.   BELONGINGS: No notable belongings; items stowed with security.   BEHAVIOR: Patient has been cooperative while in ED and has provided blood for routine labs without incident; urine has not yet been provided. Patient presently denying HI/AH/VH, however endorses SI. Patient presents as guarded and gets upset when asked questions. Patient's present speech is of normal rate/volume; patient is AOx4 and presents with a logical thought process. Patient has been resting in hospital bed awaiting consult.   TREATMENT:  Patient has not required medication intervention while in ED.   VISITORS:  Patient is unaccompanied by social supports while in ED.     COVID Exposure Screen- collateral (i.e. third-party, chart review, belongings, etc; include EMS and ED staff)     1.        *In the past 14 days, has the patient been around anyone with a positive COVID-19 test?*  (  ) Yes   ( X) No   (  ) Unknown- Reason (e.g. collateral uncertain, refusing to answer, etc.):  ______  IF YES PROCEED TO QUESTION #2. IF NO or UNKNOWN, PLEASE SKIP TO QUESTION #7  2.        Was the patient within 6 feet of them for at least 15 minutes? (  ) Yes   (  ) No   (  ) Unknown- Reason: ______   3.        Did the patient provide care for them? (  ) Yes   (  ) No   (  ) Unknown- Reason: ______   4.        Did the patient have direct physical contact with them (touched, hugged, or kissed them)? (  ) Yes   (  ) No    (  ) Unknown- Reason: ______   5.        Did the patient share eating or drinking utensils with them? (  ) Yes   (  ) No    (  ) Unknown- Reason: ______   6.        Have they sneezed, coughed, or somehow got respiratory droplets on the patient? (  ) Yes   (  ) No    (  ) Unknown- Reason: ______   7.        *Has the patient been out of [place] within the past 14 days?*  (  ) Yes   ( X) No   (  ) Unknown- Reason (e.g. collateral uncertain, refusing to answer, etc.): _______  IF YES PLEASE ANSWER THE FOLLOWING QUESTIONS:  8.        Which state/country has the patient been to? ______   9.        Was the patient there over 24 hours? (  ) Yes   (  ) No    (  ) Unknown- Reason: ______   10.     What date did the patient return to [facility]? ______

## 2020-09-13 NOTE — ED BEHAVIORAL HEALTH ASSESSMENT NOTE - ACTIVATING EVENTS/STRESSORS
Inadequate social supports/Triggering events leading to humiliation, shame, and/or despair (e.g. Loss of relationship, financial or health status) (real or anticipated)/Current or pending social isolation/Change in provider or treatment (i.e., medications, psychotherapy, milieu)/Hopeless about or dissatisfied with provider or treatment/Recent inpatient discharge/Non-compliant or not receiving treatment/Pending incarceration or homelessness

## 2020-09-13 NOTE — ED PROVIDER NOTE - OBJECTIVE STATEMENT
Pt. is a 53 yo F with a hx of mood disorder on lithium, hx of hyperlipidemia on atorvastatin, hypothyroidism on levothyroxine, hx of tobacco dependence, asthma presents with depression and desire to die and end it all.  Patient states she was just discharged from [facility] 2 days ago, has not picked up her lithium yet, was sent to a hotel upon discharge that she can't afford and is now even more depressed.  +SI.  Walked to the [address] [place], [place] [address]inct and requested to go to the hospital for evaluation. Pt. is a 53 yo F with a hx of mood disorder on lithium, hx of hyperlipidemia on atorvastatin, hypothyroidism on levothyroxine, hx of tobacco dependence, asthma presents with depression and desire to die and end it all.  Patient states she was just discharged from [facility] 2 days ago, has not picked up her lithium yet, was sent to a hotel upon discharge that she can't afford and is now even more depressed.  +SI.  Walked to the 2nd precinct and requested to go to the hospital for evaluation.  No physical complaints from patient.  Patient denies alcohol or drug use.

## 2020-09-13 NOTE — ED PROVIDER NOTE - PROGRESS NOTE DETAILS
Spoke to Dr. Kendall attending on call for telepsych who will see the patient. d/w telepsych attending . admit to [facility]. MD Bernabe d/w  regarding transfer, attempting to set up tx to [place]. MD PATTY

## 2020-09-13 NOTE — ED BEHAVIORAL HEALTH ASSESSMENT NOTE - SUICIDAL IDEATION DETAILS
patient endorses active suicidal ideation with ambivalent intent and several loosely-formulated plans.

## 2020-09-13 NOTE — ED ADULT TRIAGE NOTE - CHIEF COMPLAINT QUOTE
Pt BIBA w/ c/o of SI.  As per pt she asked her friend to bring her to the [facility] [place] because she was having SI thoughts.  Pt denied having a plan, stated last attempt was in 2012.  H/O bipolar dx.  Pt stated she was having bizarre thoughts.  Pt also had c/o chest tightness.  Denies SOB, fever, NVD

## 2020-09-13 NOTE — ED PROVIDER NOTE - CONSTITUTIONAL, MLM
normal... Well appearing, awake, alert, oriented to person, place, time/situation ; tearful, flat affect; poor eye contact

## 2020-09-13 NOTE — ED BEHAVIORAL HEALTH ASSESSMENT NOTE - DIFFERENTIAL
Bipolar disorder vs. major depressive disorder vs. unspecified personality disorder with cluster B traits vs. substance-induced mood disorder; may also meet criteria for a cannabis use disorder

## 2020-09-13 NOTE — ED ADULT NURSE NOTE - OBJECTIVE STATEMENT
Pt BIBA w/ c/o of SI.  As per pt she asked her friend to bring her [place] the [facility] because she was having SI thoughts.  Pt denied having a plan, stated last attempt was in 2012.  H/O bipolar dx.  Pt stated she was having bizarre thoughts.  Pt also had c/o chest tightness.  Denies SOB, fever, NVD  EKG completed, belonging medications and valuables sent to security, labs sent, pt. wanded.

## 2020-09-13 NOTE — ED PROVIDER NOTE - CLINICAL SUMMARY MEDICAL DECISION MAKING FREE TEXT BOX
Telepsych consult planned for this recently discharged psychiatric patient who still c/o depression and SI.  Labs, EKG, urine to be done in ED for medical clearance.

## 2020-09-13 NOTE — ED BEHAVIORAL HEALTH NOTE - BEHAVIORAL HEALTH NOTE
After being informed by Dr. Obrien that pt required inpt admission and HH was with no beds, SW made referral to [facility]. SW in contact with [facility] staff Gisell. 2PC legals, EKG, and pregnancy test faxed to [facility]. Per Gisell, Pt accepted for admission to [facility]. Accepting MD is Dr. Maninder Bansal. Per Gisell, no nurse to nurse needed. SW updated pt, who verbalized understanding. ED attending and Dr. Obrien updated. ED  to set up transport. GULSHAN called [employer] to complete auth, which will be forwarded to [facility] once [employer] calls back with auth number.

## 2020-09-13 NOTE — ED BEHAVIORAL HEALTH ASSESSMENT NOTE - MEDICAL ISSUES AND PLAN (INCLUDE STANDING AND PRN MEDICATION)
Will continue the patient on her outpatient medication regimen of Synthroid 125mcg PO daily and Lipitor.
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
## 2020-09-13 NOTE — ED BEHAVIORAL HEALTH ASSESSMENT NOTE - ADDITIONAL DETAILS ALL
The patient endorses active suicidal ideation with ambivalent intent and several loosely-formulated plans. She has had 1 prior suicide attempt by overdose (2012).

## 2020-09-13 NOTE — ED BEHAVIORAL HEALTH ASSESSMENT NOTE - AXIS IV
Problems with primary support/Economic problems/Problems with access to healthcare services/Housing problems

## 2020-09-13 NOTE — ED BEHAVIORAL HEALTH ASSESSMENT NOTE - SUICIDE RISK FACTORS
Hopelessness or despair/Alcohol/Substance abuse disorders/Insomnia/Mood Disorder current/past/Cluster B Personality disorders or traits current/past/Unable to engage in safety planning/Current mood episode/Anhedonia

## 2020-09-13 NOTE — ED BEHAVIORAL HEALTH ASSESSMENT NOTE - OTHER PAST PSYCHIATRIC HISTORY (INCLUDE DETAILS REGARDING ONSET, COURSE OF ILLNESS, INPATIENT/OUTPATIENT TREATMENT)
The patient carries a diagnosis of bipolar disorder. She has had multiple prior inpatient psychiatric hospitalizations, most recently at [facility] (discharged this past Friday). The patient did not follow-up with her assigned outpatient providers following her recent discharge.

## 2020-09-14 DIAGNOSIS — R76.8 OTHER SPECIFIED ABNORMAL IMMUNOLOGICAL FINDINGS IN SERUM: ICD-10-CM

## 2020-09-17 DIAGNOSIS — E66.01 MORBID (SEVERE) OBESITY DUE TO EXCESS CALORIES: ICD-10-CM

## 2020-09-17 DIAGNOSIS — F31.60 BIPOLAR DISORDER, CURRENT EPISODE MIXED, UNSPECIFIED: ICD-10-CM

## 2020-09-17 DIAGNOSIS — E03.9 HYPOTHYROIDISM, UNSPECIFIED: ICD-10-CM

## 2020-09-17 DIAGNOSIS — F17.210 NICOTINE DEPENDENCE, CIGARETTES, UNCOMPLICATED: ICD-10-CM

## 2020-09-17 DIAGNOSIS — Z59.0 HOMELESSNESS: ICD-10-CM

## 2020-09-17 DIAGNOSIS — J45.909 UNSPECIFIED ASTHMA, UNCOMPLICATED: ICD-10-CM

## 2020-09-17 DIAGNOSIS — F12.959 CANNABIS USE, UNSPECIFIED WITH PSYCHOTIC DISORDER, UNSPECIFIED: ICD-10-CM

## 2020-09-17 DIAGNOSIS — E78.5 HYPERLIPIDEMIA, UNSPECIFIED: ICD-10-CM

## 2020-09-17 DIAGNOSIS — R45.851 SUICIDAL IDEATIONS: ICD-10-CM

## 2020-09-17 DIAGNOSIS — Z91.5 PERSONAL HISTORY OF SELF-HARM: ICD-10-CM

## 2020-09-17 SDOH — ECONOMIC STABILITY - HOUSING INSECURITY: HOMELESSNESS: Z59.0

## 2020-09-24 ENCOUNTER — APPOINTMENT (OUTPATIENT)
Dept: PSYCHIATRY | Facility: CLINIC | Age: 55
End: 2020-09-24

## 2021-06-08 ENCOUNTER — NON-APPOINTMENT (OUTPATIENT)
Age: 56
End: 2021-06-08

## 2021-06-09 PROBLEM — F39 UNSPECIFIED MOOD [AFFECTIVE] DISORDER: Chronic | Status: ACTIVE | Noted: 2020-09-13

## 2021-06-10 ENCOUNTER — APPOINTMENT (OUTPATIENT)
Dept: INTERNAL MEDICINE | Facility: CLINIC | Age: 56
End: 2021-06-10
Payer: MEDICAID

## 2021-06-10 DIAGNOSIS — F40.01 AGORAPHOBIA WITH PANIC DISORDER: ICD-10-CM

## 2021-06-10 PROCEDURE — 99441: CPT | Mod: GC

## 2021-06-11 PROBLEM — F40.01 FEAR OF TRAVEL WITH PANIC ATTACKS: Status: ACTIVE | Noted: 2020-01-15

## 2021-06-11 RX ORDER — LEVOTHYROXINE SODIUM 0.12 MG/1
125 TABLET ORAL
Qty: 90 | Refills: 0 | Status: ACTIVE | COMMUNITY
Start: 1900-01-01 | End: 1900-01-01

## 2021-06-11 RX ORDER — ATORVASTATIN CALCIUM 10 MG/1
10 TABLET, FILM COATED ORAL DAILY
Qty: 90 | Refills: 0 | Status: ACTIVE | COMMUNITY
Start: 2020-07-13 | End: 1900-01-01

## 2021-06-14 NOTE — ASSESSMENT
[FreeTextEntry1] : 55F w pmh anxiety/MDD, hypothyroid, HLD presents for telephonic visit for transportation forms. \par \par #Transportation forms - has not been seen in office, does not qualify for private transportation based on symptoms described, however recommended finding new outpt psychiatrist to obtain documentation. Also recommended patient establish care with a primary care provider in [place] as transportation from [place] to [facility] cannot be justified. \par \par #Hypothyroidism - c/w Synthroid\par \par #HLD - c/w lipitor \par \par

## 2021-06-14 NOTE — END OF VISIT
[FreeTextEntry3] : I saw and evaluated the patient.  The findings and assessment were discussed with the resident and I agree with resident’s plan as documented in the above, in the resident’s note.\par \par Pertinent exam findings: Well-appearing, NAD. \par \par Anxiety: Patient requesting transportation for anxiety.  Patient is off treatment, not following w/ psych. She is also requesting benzos.  Discussed that patient needs to see psych regarding treatment and need for transportation.  Encouraged telehealth psych visit.  Also discussed that benzo therapy should be managed by psych as well.  Would be able justify 2015 form for anxiety if actually that severe but may not be able to justify 2020 form for distance (she is in [place]) -- discussed this issue w/ patient.\par \par I spent more than 5 minutes for the total time of this encounter, including time spent on phone w/ patient, chart review, and documentation.

## 2021-06-14 NOTE — HISTORY OF PRESENT ILLNESS
[FreeTextEntry1] : Request for transportation forms \par  [de-identified] : EMANI MARLINE,an established pt at this office, reached out to this provider for xxxx. No recent visit within the last 7 days. A visit is not scheduled within the next 7 days at this time. \par \par Discussed with patient: You have chosen to receive care through the use of tele-media. Tele-media enables health care providers at different locations to provide safe, effective, and convenient care through the use of technology. Please note this is a billable encounter. As with any health care service, there are risks associated with the use of tele-media, including equipment failure, poor image and/or resolution, and  issues. You understand that I cannot physically examine you and that you may need to come to the clinic to complete the assessment. Patient agreed verbally to understanding the risks and benefits of tele-media as explained. All questions regarding tele-media encounters were answered. \par \par \par Total time spent with patient on the phone is 22 min\par \par 55F pmh Anxiety, MDD, Hypothyroidism, HLD present for request for transportation forms. Pt is currently staying in [facility] in [place] and reports she is unable to use public transportation to come for in person visit. She reports symptom of feeling closed in, anxiety, panic attacks when using public transportation. She previously had a psychiatrist but left that practice due to disliking the practice. She currently is not taking any of her medications which include Synthroid and lipitor.

## 2021-12-06 NOTE — H&P PST ADULT - PROBLEM SELECTOR PLAN 3
Mildly to Moderately Impaired: difficulty hearing in some environments or speaker may need to increase volume or speak distinctly
continue statin pre op

## 2022-09-29 ENCOUNTER — EMERGENCY (EMERGENCY)
Facility: HOSPITAL | Age: 57
LOS: 0 days | Discharge: HOME | End: 2022-09-29
Attending: STUDENT IN AN ORGANIZED HEALTH CARE EDUCATION/TRAINING PROGRAM | Admitting: STUDENT IN AN ORGANIZED HEALTH CARE EDUCATION/TRAINING PROGRAM

## 2022-09-29 VITALS
SYSTOLIC BLOOD PRESSURE: 97 MMHG | RESPIRATION RATE: 18 BRPM | OXYGEN SATURATION: 98 % | DIASTOLIC BLOOD PRESSURE: 52 MMHG | TEMPERATURE: 98 F | HEIGHT: 64 IN | HEART RATE: 76 BPM

## 2022-09-29 DIAGNOSIS — E66.01 MORBID (SEVERE) OBESITY DUE TO EXCESS CALORIES: ICD-10-CM

## 2022-09-29 DIAGNOSIS — E03.9 HYPOTHYROIDISM, UNSPECIFIED: ICD-10-CM

## 2022-09-29 DIAGNOSIS — K08.89 OTHER SPECIFIED DISORDERS OF TEETH AND SUPPORTING STRUCTURES: ICD-10-CM

## 2022-09-29 DIAGNOSIS — Z87.39 PERSONAL HISTORY OF OTHER DISEASES OF THE MUSCULOSKELETAL SYSTEM AND CONNECTIVE TISSUE: Chronic | ICD-10-CM

## 2022-09-29 DIAGNOSIS — I10 ESSENTIAL (PRIMARY) HYPERTENSION: ICD-10-CM

## 2022-09-29 DIAGNOSIS — Z87.891 PERSONAL HISTORY OF NICOTINE DEPENDENCE: ICD-10-CM

## 2022-09-29 DIAGNOSIS — J45.909 UNSPECIFIED ASTHMA, UNCOMPLICATED: ICD-10-CM

## 2022-09-29 DIAGNOSIS — K14.9 DISEASE OF TONGUE, UNSPECIFIED: Chronic | ICD-10-CM

## 2022-09-29 DIAGNOSIS — Z98.82 BREAST IMPLANT STATUS: Chronic | ICD-10-CM

## 2022-09-29 DIAGNOSIS — Z98.51 TUBAL LIGATION STATUS: ICD-10-CM

## 2022-09-29 DIAGNOSIS — Z98.891 HISTORY OF UTERINE SCAR FROM PREVIOUS SURGERY: Chronic | ICD-10-CM

## 2022-09-29 DIAGNOSIS — Z98.890 OTHER SPECIFIED POSTPROCEDURAL STATES: Chronic | ICD-10-CM

## 2022-09-29 DIAGNOSIS — Z90.49 ACQUIRED ABSENCE OF OTHER SPECIFIED PARTS OF DIGESTIVE TRACT: ICD-10-CM

## 2022-09-29 DIAGNOSIS — E78.5 HYPERLIPIDEMIA, UNSPECIFIED: ICD-10-CM

## 2022-09-29 DIAGNOSIS — F39 UNSPECIFIED MOOD [AFFECTIVE] DISORDER: ICD-10-CM

## 2022-09-29 DIAGNOSIS — Z98.51 TUBAL LIGATION STATUS: Chronic | ICD-10-CM

## 2022-09-29 DIAGNOSIS — Z90.49 ACQUIRED ABSENCE OF OTHER SPECIFIED PARTS OF DIGESTIVE TRACT: Chronic | ICD-10-CM

## 2022-09-29 DIAGNOSIS — Z98.890 OTHER SPECIFIED POSTPROCEDURAL STATES: ICD-10-CM

## 2022-09-29 PROCEDURE — 99284 EMERGENCY DEPT VISIT MOD MDM: CPT

## 2022-09-29 NOTE — ED PROVIDER NOTE - NSICDXPASTSURGICALHX_GEN_ALL_CORE_FT
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

## 2022-09-29 NOTE — ED ADULT NURSE NOTE - NSICDXPASTMEDICALHX_GEN_ALL_CORE_FT
PAST MEDICAL HISTORY:  Asthma resolved, on no med    Back pain     Hyperlipidemia     Hypothyroid     Mood disorder     Morbid obesity     Smoker unmotivated to quit

## 2022-09-29 NOTE — CONSULT NOTE ADULT - SUBJECTIVE AND OBJECTIVE BOX
Patient is a 56y old  Female who presents with a chief complaint of loose upper left tooth and swelling.     HPI: Patient had been in pain for about three weeks now.         PAST MEDICAL & SURGICAL HISTORY:  Hypothyroid      Hyperlipidemia      Asthma  resolved, on no med      Morbid obesity      Smoker unmotivated to quit      Back pain      Mood disorder      S/P appendectomy      S/P  section  x 2      S/P tubal ligation        S/P ovarian cystectomy      History of TMJ syndrome  s/p repair jaw lock       S/P breast augmentation      S/P hernia repair      Disorder of tongue  mass removal from tongue 3/2018        (   ) heart valve replacement  (   ) joint replacement  (   ) pregnancy    MEDICATIONS  (STANDING):    MEDICATIONS  (PRN):      Allergies    No Known Allergies    Intolerances        FAMILY HISTORY:  Family history of diabetes mellitus (Father)    Family history of breast cancer (Grandparent)        *SOCIAL HISTORY: (   ) Tobacco; (   ) ETOH    *Last Dental Visit:    Vital Signs Last 24 Hrs  T(C): 36.6 (29 Sep 2022 14:43), Max: 36.6 (29 Sep 2022 14:43)  T(F): 97.8 (29 Sep 2022 14:43), Max: 97.8 (29 Sep 2022 14:43)  HR: 76 (29 Sep 2022 14:43) (76 - 76)  BP: 97/52 (29 Sep 2022 14:43) (97/52 - 97/52)  BP(mean): --  RR: 18 (29 Sep 2022 14:43) (18 - 18)  SpO2: 98% (29 Sep 2022 14:43) (98% - 98%)    Parameters below as of 29 Sep 2022 14:43  Patient On (Oxygen Delivery Method): room air    BP: 131/50   Pulse: 68  Temp: 97.6    LABS:                  EOE:  TMJ (  - ) clicks                     ( -  ) pops                     ( -  ) crepitus             Mandible <<FROM>>             Facial bones and MOM <<grossly intact>>             ( -  ) trismus             (  - ) lymphadenopathy             ( -  ) swelling             ( -  ) asymmetry             ( -  ) palpation             (  - ) dyspnea             ( -  ) dysphagia             ( -  ) loss of consciousness    IOE:  <<permanent/primary/mixed>> dentition: <<grossly intact>> OR <<multiple carious teeth>> OR <<multiple missing teeth>>           hard/soft palate:  (   ) palatal torus, <<No pathology noted>>           tongue/FOM <<No pathology noted>>           labial/buccal mucosa <<No pathology noted>>           ( +  ) percussion           ( +  ) palpation           ( +  ) swelling            ( -  ) abscess           ( -  ) sinus tract    Dentition present: <<   >>  Mobility: <<  >>  Caries: <<   >>         *DENTAL RADIOGRAPHS: periapical x-ray       *ASSESSMENT: tooth #13 has mobility of grade 3 and periapical pathology observed in the periapical x-ray.       *PLAN: Extraction of tooth #13 and incision and drainage of the vestibular swelling.     PROCEDURE:    Consents and site obtained. Risks and benefits discussed with patient as per OS sheet dated 00. Administered 3 carpules of 4% septocaine with 1:200,000 epinephrine buccal infiltration . Throat screen placed. Elevated and extracted tooth #13. Drainage of tooth #13 and irrigated with saline solution. Hemostasis achieved. Post-op x-ray taken.    RX: Amoxicillin 500 mg every 8 hours for 7 days.   Ibuprofen 600 mg for pain as needed.     RECOMMENDATIONS:  1) <<   >>  2) Dental F/U with outpatient dentist for comprehensive dental care.   3) If any difficulty swallowing/breathing, fever occur, return to ER.     Resident Name, pager #

## 2022-09-29 NOTE — ED ADULT NURSE NOTE - NS ED NOTE ABUSE RESPONSE YN
Take Ibuprofen 600 mg three times a day, with food     Go to [facility] for COVID testing   Note to be out of work this week  If COVID positive will need to be out of work until 8/30/21 Yes

## 2022-09-29 NOTE — ED ADULT NURSE NOTE - NSICDXFAMILYHX_GEN_ALL_CORE_FT
FAMILY HISTORY:  Father  Still living? No  Family history of diabetes mellitus, Age at diagnosis: Age Unknown    Grandparent  Still living? No  Family history of breast cancer, Age at diagnosis: Age Unknown

## 2022-09-29 NOTE — ED PROVIDER NOTE - NS ED ROS FT
Constitutional: no fever  HENT- + dental pain  Cardiovascular: no chest pain  Respiratory:  no cough  Neurological: no altered mental status

## 2022-09-29 NOTE — ED PROVIDER NOTE - PHYSICAL EXAMINATION
vss  gen- NAD, aaox3  HENT- oropharynx clear, no drooling/stridor, tolerating secretions, normal voice, normal base of tongue, mild L cheek swelling w/o erythema  Dental- TTP to tooth #12, laxity to tooth and mild fluctuance  Eyes- EOMI/PERRL b/l, no conj injection  card-rrr  lungs-no resp distress, CTAB

## 2022-09-29 NOTE — ED PROVIDER NOTE - OBJECTIVE STATEMENT
55 yo f no pmh, + smoker  pt presents for eval of dental pain. pt w/ pain/swelling to L upper jaw/cheek. sx for 2 days and worsened the past day.  no fevers/chills. pain w/ eating. tolerating liquids well and no voice change. pt endorses laxity to tooth 12.  no cp/sob

## 2023-05-05 ENCOUNTER — APPOINTMENT (OUTPATIENT)
Dept: ORTHOPEDIC SURGERY | Facility: CLINIC | Age: 58
End: 2023-05-05

## 2023-06-15 ENCOUNTER — APPOINTMENT (OUTPATIENT)
Dept: PODIATRY | Facility: CLINIC | Age: 58
End: 2023-06-15

## 2023-07-14 ENCOUNTER — EMERGENCY (EMERGENCY)
Facility: HOSPITAL | Age: 58
LOS: 0 days | Discharge: HOME | End: 2023-07-14
Attending: STUDENT IN AN ORGANIZED HEALTH CARE EDUCATION/TRAINING PROGRAM
Payer: MEDICAID

## 2023-07-14 VITALS
RESPIRATION RATE: 18 BRPM | SYSTOLIC BLOOD PRESSURE: 123 MMHG | HEART RATE: 88 BPM | TEMPERATURE: 99 F | OXYGEN SATURATION: 97 % | DIASTOLIC BLOOD PRESSURE: 55 MMHG | WEIGHT: 175.05 LBS
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
## 2023-07-14 DIAGNOSIS — Z87.39 PERSONAL HISTORY OF OTHER DISEASES OF THE MUSCULOSKELETAL SYSTEM AND CONNECTIVE TISSUE: Chronic | ICD-10-CM

## 2023-07-14 DIAGNOSIS — R20.2 PARESTHESIA OF SKIN: ICD-10-CM

## 2023-07-14 DIAGNOSIS — E78.5 HYPERLIPIDEMIA, UNSPECIFIED: ICD-10-CM

## 2023-07-14 DIAGNOSIS — F17.200 NICOTINE DEPENDENCE, UNSPECIFIED, UNCOMPLICATED: ICD-10-CM

## 2023-07-14 DIAGNOSIS — Z98.82 BREAST IMPLANT STATUS: Chronic | ICD-10-CM

## 2023-07-14 DIAGNOSIS — R20.0 ANESTHESIA OF SKIN: ICD-10-CM

## 2023-07-14 DIAGNOSIS — M25.521 PAIN IN RIGHT ELBOW: ICD-10-CM

## 2023-07-14 DIAGNOSIS — Z98.51 TUBAL LIGATION STATUS: Chronic | ICD-10-CM

## 2023-07-14 DIAGNOSIS — Z98.890 OTHER SPECIFIED POSTPROCEDURAL STATES: Chronic | ICD-10-CM
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
## 2023-07-14 DIAGNOSIS — E03.9 HYPOTHYROIDISM, UNSPECIFIED: ICD-10-CM

## 2023-07-14 DIAGNOSIS — Z98.51 TUBAL LIGATION STATUS: ICD-10-CM

## 2023-07-14 DIAGNOSIS — Z90.49 ACQUIRED ABSENCE OF OTHER SPECIFIED PARTS OF DIGESTIVE TRACT: ICD-10-CM

## 2023-07-14 DIAGNOSIS — Z98.82 BREAST IMPLANT STATUS: ICD-10-CM

## 2023-07-14 DIAGNOSIS — J45.909 UNSPECIFIED ASTHMA, UNCOMPLICATED: ICD-10-CM

## 2023-07-14 DIAGNOSIS — Z98.891 HISTORY OF UTERINE SCAR FROM PREVIOUS SURGERY: Chronic | ICD-10-CM

## 2023-07-14 DIAGNOSIS — Z90.6 ACQUIRED ABSENCE OF OTHER PARTS OF URINARY TRACT: ICD-10-CM

## 2023-07-14 DIAGNOSIS — Z90.49 ACQUIRED ABSENCE OF OTHER SPECIFIED PARTS OF DIGESTIVE TRACT: Chronic | ICD-10-CM

## 2023-07-14 DIAGNOSIS — K14.9 DISEASE OF TONGUE, UNSPECIFIED: Chronic | ICD-10-CM

## 2023-07-14 DIAGNOSIS — F43.10 POST-TRAUMATIC STRESS DISORDER, UNSPECIFIED: ICD-10-CM

## 2023-07-14 PROCEDURE — 99283 EMERGENCY DEPT VISIT LOW MDM: CPT | Mod: 25

## 2023-07-14 PROCEDURE — 73130 X-RAY EXAM OF HAND: CPT | Mod: 26,RT

## 2023-07-14 PROCEDURE — 73060 X-RAY EXAM OF HUMERUS: CPT | Mod: RT

## 2023-07-14 PROCEDURE — 73110 X-RAY EXAM OF WRIST: CPT | Mod: RT

## 2023-07-14 PROCEDURE — 73080 X-RAY EXAM OF ELBOW: CPT | Mod: 26,RT

## 2023-07-14 PROCEDURE — 73130 X-RAY EXAM OF HAND: CPT | Mod: RT

## 2023-07-14 PROCEDURE — 99284 EMERGENCY DEPT VISIT MOD MDM: CPT

## 2023-07-14 PROCEDURE — 73090 X-RAY EXAM OF FOREARM: CPT | Mod: RT

## 2023-07-14 PROCEDURE — 73110 X-RAY EXAM OF WRIST: CPT | Mod: 26,RT

## 2023-07-14 PROCEDURE — 96372 THER/PROPH/DIAG INJ SC/IM: CPT

## 2023-07-14 PROCEDURE — 73080 X-RAY EXAM OF ELBOW: CPT | Mod: RT

## 2023-07-14 PROCEDURE — 73060 X-RAY EXAM OF HUMERUS: CPT | Mod: 26,RT

## 2023-07-14 PROCEDURE — 73090 X-RAY EXAM OF FOREARM: CPT | Mod: 26,RT

## 2023-07-14 RX ORDER — KETOROLAC TROMETHAMINE 30 MG/ML
30 SYRINGE (ML) INJECTION ONCE
Refills: 0 | Status: DISCONTINUED | OUTPATIENT
Start: 2023-07-14 | End: 2023-07-14

## 2023-07-14 RX ORDER — IBUPROFEN 200 MG
1 TABLET ORAL
Qty: 28 | Refills: 0
Start: 2023-07-14 | End: 2023-07-20

## 2023-07-14 RX ORDER — ACETAMINOPHEN 500 MG
650 TABLET ORAL ONCE
Refills: 0 | Status: COMPLETED | OUTPATIENT
Start: 2023-07-14 | End: 2023-07-14

## 2023-07-14 RX ADMIN — Medication 30 MILLIGRAM(S): at 07:55

## 2023-07-14 RX ADMIN — Medication 650 MILLIGRAM(S): at 07:57
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
## 2023-07-14 NOTE — ED PROVIDER NOTE - PROGRESS NOTE DETAILS
BF: XR imaging seems to be c/w radial head fracture. Splint applied as per procedure note. Pt tolerated well. Rx sent for motrin. Given follow up with orthopedics. Return precautions discussed. All questions answered.

## 2023-07-14 NOTE — ED PROVIDER NOTE - WR INTERPRETATION 5
Anterior fat pad visualized as well as posterior fat pad which is suggestive of radial head fracture
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
## 2023-07-14 NOTE — ED ADULT TRIAGE NOTE - NS ED TRIAGE AVPU SCALE
Alert-The patient is alert, awake and responds to voice. The patient is oriented to time, place, and person. The triage nurse is able to obtain subjective information. Attending Attestation (For Attendings USE Only)...

## 2023-07-14 NOTE — ED PROVIDER NOTE - PATIENT PORTAL LINK FT
You can access the FollowMyHealth Patient Portal offered by [facility] by registering at the following website: [email or web address]. By joining [employer]’s FollowMyHealth portal, you will also be able to view your health information using other applications (apps) compatible with our system.
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
## 2023-07-14 NOTE — ED PROVIDER NOTE - PHYSICAL EXAMINATION
CONSTITUTIONAL: Well-developed; well-nourished; in no acute distress.   SKIN: Warm, dry  HEAD: Normocephalic; atraumatic  ENT: airway clear.  NECK: Supple  EXT: Tenderness upon palpation of right lateral epicondyle. No anatomical snuff box tenderness, no tenderness of right wrist, right shoulder, or right humerus. Decreased ROM of right elbow, right wrist, and in the ulnar distribution of right hand. Decreased  strength in ulnar distribution. 2+ radial pulses bilaterally. Sensation intact bilateral upper extremities.   NEURO: Alert, oriented, grossly unremarkable  PSYCH: Slightly anxious, appropriate.

## 2023-07-14 NOTE — ED ADULT NURSE NOTE - NSFALLRISKINTERV_ED_ALL_ED

## 2023-07-14 NOTE — ED PROVIDER NOTE - NSFOLLOWUPCLINICS_GEN_ALL_ED_FT
[facility]  Orthpedic  [address] [place], [place]   Phone: [phone]  Fax:

## 2023-07-14 NOTE — ED PROVIDER NOTE - IV ALTEPLASE INCLUSION HIDDEN
Subjective:       Patient ID: Lima Maldonado is a 68 y.o. female.    Chief Complaint: No chief complaint on file.    HPI Comments: Dr. Baker's patient    She had stage I ER positive disease with an intermediate risk Oncotype score.     Here for adjuvant TC #4 scheduled for tomorrow.    Patient states she has upper respiratory congestion and post nasal drip which she tends to get after chemo although it has lasted longer this cycle. She has had low grade fevers, t max 99.4. No nausea, vomiting, mucositis, neuropathy. Energy level and appetite are good. No breast/chest wall complaints.  Has been on Doxycyline for skin infection at neck, finishes that tomorrow.  Notes bony aches/pains from neulasta, takes Norco as needed for that symptom.   Bowel and urinary function is normal.           Breast history: mammography on September 20, 2016 demonstrated a new irregular mass with spiculated margins seen in the left breast at  5 o'clock along the surgical scar site.  ultrasound DEMONSTRATED A SOLID 8 X 8 X 7 MM MASS.    A needle biopsy in September 27 showed infiltrating carcinoma, histologic grade 3, nuclear grade 2, mitotic index 1. Tumor was 90% ER positive, 70% LA positive, and 1+ HER-2.     MRI of the breast showed a 1.7 x 1.3 cm lesion in the lower outer quadrant of the left breast.  PET/CT on October 7 was negative for any evidence of distant metastasis.  In the  On November 16 bilateral mastectomies were performed. Left pressure 1.5 cm intermediate grade carcinoma with ductal and lobular features. There was focal dermal invasion. Margins were negative. The right breast was without abnormality.  Oncotype score returned 25 - intermediate risk    Review of Systems   Constitutional: Negative for appetite change and unexpected weight change.   HENT: Positive for rhinorrhea. Negative for trouble swallowing.    Eyes: Negative for visual disturbance.   Respiratory: Negative for cough, chest tightness and shortness of  breath.    Cardiovascular: Negative for chest pain, palpitations and leg swelling.   Gastrointestinal: Negative for abdominal pain, constipation, diarrhea, nausea and vomiting.   Genitourinary: Negative for dysuria and frequency.   Musculoskeletal: Negative for back pain.   Skin: Negative for rash.   Neurological: Negative for dizziness and headaches.   Hematological: Negative for adenopathy. Does not bruise/bleed easily.   Psychiatric/Behavioral: The patient is not nervous/anxious.        Objective:      Physical Exam   Constitutional: She is oriented to person, place, and time. She appears well-developed and well-nourished. No distress.   ECOG 0  Presents alone   HENT:   Head: Normocephalic.   Mouth/Throat: Oropharynx is clear and moist. No oropharyngeal exudate.   Eyes: Conjunctivae are normal. Pupils are equal, round, and reactive to light. No scleral icterus.   Neck: Normal range of motion. Neck supple. No thyromegaly present.   Cardiovascular: Normal rate, regular rhythm, normal heart sounds and intact distal pulses.    Pulmonary/Chest: Effort normal and breath sounds normal. No respiratory distress.   Abdominal: Soft. Bowel sounds are normal. She exhibits no distension and no mass. There is no tenderness.   No hepatosplenomegaly     Musculoskeletal: Normal range of motion. She exhibits no edema or tenderness.   No spinal or paraspinal tenderness to palpation     Lymphadenopathy:     She has no cervical adenopathy.   Neurological: She is alert and oriented to person, place, and time. No cranial nerve deficit.   No spinal or paraspinal tenderness to palpation     Skin: Skin is warm and dry.   Small resolving area of erythema at base of neck where patient previously had skin infection   Psychiatric: She has a normal mood and affect. Her behavior is normal. Judgment and thought content normal.   Vitals reviewed.      Assessment:       1. Malignant neoplasm of lower-outer quadrant of left female breast    2.  Encounter for antineoplastic chemotherapy        Plan:         Continue with 4th and final cycle of Taxotere/Cytoxan tomorrow, she will receive neulasta on day 2 due to past complications/hospitilization for neutropenic fever.  Return to clinic in 3-4 weeks to discuss adjuvant endocrine therapy.  Referral for Survivorship/Completion of treatment summary submitted.  All questions answered to satisfaction of patient.    show

## 2023-07-14 NOTE — ED PROVIDER NOTE - NSPTACCESSSVCSAPPTDETAILS_ED_ALL_ED_FT
Right elbow pain, anterior fat pad seen on imaging that can suggest radial head fracture Right elbow pain, anterior fat pad and posterior fat pad seen on imaging that can suggest radial head fracture

## 2023-07-14 NOTE — ED PROVIDER NOTE - WR ORDER STATUS 1
Radiation Oncology Follow Up Visit Note    Guy Gardner here for 6 week follow up visit with Dr. Jones s/p radiation to the tonsil completed on 1/12/17. Pt had CT neck completed on 1/31/17.      Eye Problem(s):negative  ENT Problem(s):left side of face sore, bottom gums still sore. Pt denies difficulty swallowing. States taste is starting to come back.   Cardiovascular problem(s):negative  Respiratory problem(s):negative  Gastro-intestinal problem(s): good appetite  Genito-urinary problem(s):negative  Musculoskeletal problem(s):negative  Integumentary problem(s):negative  Neurological problem(s):negative  Psychiatric problem(s):negative  Endocrine problem(s):negative  Hematologic and/or Lymphatic problem(s):negative  Pain: mouth/face/gums pain currently up 4/10, up to 7/10 at times in the morning, relieved with magic mouthwash             Performed

## 2023-07-18 ENCOUNTER — EMERGENCY (EMERGENCY)
Facility: HOSPITAL | Age: 58
LOS: 0 days | Discharge: HOME | End: 2023-07-18
Attending: EMERGENCY MEDICINE
Payer: MEDICAID

## 2023-07-18 VITALS
SYSTOLIC BLOOD PRESSURE: 136 MMHG | OXYGEN SATURATION: 97 % | RESPIRATION RATE: 18 BRPM | TEMPERATURE: 98 F | DIASTOLIC BLOOD PRESSURE: 78 MMHG | HEART RATE: 79 BPM

## 2023-07-18 DIAGNOSIS — S52.121A DISPLACED FRACTURE OF HEAD OF RIGHT RADIUS, INITIAL ENCOUNTER FOR CLOSED FRACTURE: ICD-10-CM

## 2023-07-18 DIAGNOSIS — Z98.51 TUBAL LIGATION STATUS: Chronic | ICD-10-CM

## 2023-07-18 DIAGNOSIS — R51.9 HEADACHE, UNSPECIFIED: ICD-10-CM

## 2023-07-18 DIAGNOSIS — K14.9 DISEASE OF TONGUE, UNSPECIFIED: Chronic | ICD-10-CM
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
## 2023-07-18 DIAGNOSIS — M25.521 PAIN IN RIGHT ELBOW: ICD-10-CM

## 2023-07-18 DIAGNOSIS — Z98.890 OTHER SPECIFIED POSTPROCEDURAL STATES: Chronic | ICD-10-CM

## 2023-07-18 DIAGNOSIS — M25.511 PAIN IN RIGHT SHOULDER: ICD-10-CM

## 2023-07-18 DIAGNOSIS — Z98.891 HISTORY OF UTERINE SCAR FROM PREVIOUS SURGERY: Chronic | ICD-10-CM
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
## 2023-07-18 PROCEDURE — 99284 EMERGENCY DEPT VISIT MOD MDM: CPT | Mod: 25

## 2023-07-18 PROCEDURE — 96372 THER/PROPH/DIAG INJ SC/IM: CPT

## 2023-07-18 PROCEDURE — 99284 EMERGENCY DEPT VISIT MOD MDM: CPT

## 2023-07-18 PROCEDURE — 73080 X-RAY EXAM OF ELBOW: CPT | Mod: RT

## 2023-07-18 PROCEDURE — 73030 X-RAY EXAM OF SHOULDER: CPT | Mod: RT

## 2023-07-18 PROCEDURE — 73030 X-RAY EXAM OF SHOULDER: CPT | Mod: 26,RT

## 2023-07-18 PROCEDURE — 73080 X-RAY EXAM OF ELBOW: CPT | Mod: 26,RT

## 2023-07-18 RX ORDER — IBUPROFEN 200 MG
1 TABLET ORAL
Qty: 20 | Refills: 0
Start: 2023-07-18 | End: 2023-07-22

## 2023-07-18 RX ORDER — KETOROLAC TROMETHAMINE 30 MG/ML
30 SYRINGE (ML) INJECTION ONCE
Refills: 0 | Status: DISCONTINUED | OUTPATIENT
Start: 2023-07-18 | End: 2023-07-18

## 2023-07-18 RX ADMIN — Medication 30 MILLIGRAM(S): at 18:37

## 2023-07-18 NOTE — ED PROVIDER NOTE - NSFOLLOWUPINSTRUCTIONS_ED_ALL_ED_FT
Our Emergency Department Referral Coordinators will be reaching out to you in the next 24-48 hours from 9:00am to 5:00pm (Monday - Friday) with a follow up appointment. Please expect a phone call from the hospital in that time frame. If you do not receive a call or if you have any questions or concerns, you can reach them at [phone] or [phone].    ~~~  Elbow Fracture    WHAT YOU NEED TO KNOW:    An elbow fracture is a break in one or more of the 3 bones that form your elbow joint. Osteoporosis (brittle bones) can increase your risk for an elbow fracture.    DISCHARGE INSTRUCTIONS:  Return to the emergency department if:   Your skin becomes swollen, cold, or pale.  Your elbow, hand, or fingers are numb.    Call your doctor if:   You have a fever.  The pain gets worse, even after you rest and take your medicine.  You have new or more trouble moving your arm.  You have new sores around the area of your brace, splint, or cast.  Your brace, splint, or cast becomes damaged.  You have questions or concerns about your condition or care.    Medicines: You may need any of the following:     Prescription pain medicine may be given. Ask your healthcare provider how to take this medicine safely. Some prescription pain medicines contain acetaminophen. Do not take other medicines that contain acetaminophen without talking to your healthcare provider. Too much acetaminophen may cause liver damage. Prescription pain medicine may cause constipation. Ask your healthcare provider how to prevent or treat constipation.     NSAIDs, such as ibuprofen, help decrease swelling, pain, and fever. This medicine is available with or without a doctor's order. NSAIDs can cause stomach bleeding or kidney problems in certain people. If you take blood thinner medicine, always ask your healthcare provider if NSAIDs are safe for you. Always read the medicine label and follow directions.    Take your medicine as directed. Contact your healthcare provider if you think your medicine is not helping or if you have side effects. Tell him or her if you are allergic to any medicine. Keep a list of the medicines, vitamins, and herbs you take. Include the amounts, and when and why you take them. Bring the list or the pill bottles to follow-up visits. Carry your medicine list with you in case of an emergency.    Self-care:     Elevate your elbow above the level of your heart as often as you can. This will help decrease swelling and pain. Prop your elbow on pillows or blankets to keep it elevated comfortably. While your elbow is elevated, wiggle your fingers and open and close them to prevent hand stiffness.    Apply ice on your elbow on your elbow for 15 to 20 minutes every hour or as directed. Use an ice pack, or put crushed ice in a plastic bag. Cover it with a towel. Ice helps prevent tissue damage and decreases swelling and pain.    Go to physical therapy as directed. A physical therapist can teach you exercises to help improve movement and strength and to decrease pain.    Care for your brace, cast, or splint: Follow instructions about when you may take a bath or shower. It is important not to get your brace, cast, or splint wet. Cover your device with a plastic bag before you bathe. Tape the bag to your skin above the device to help keep out water. Hold your elbow away from the water in case the bag breaks.    Check the skin around your cast, brace, or splint daily for any redness or open skin.    Do not use a sharp or pointed object to scratch your skin under the cast, brace, or splint.    Do not remove your brace or splint unless directed.    Follow up with your doctor as directed: You may need to see a specialist. You may need more x-rays and treatment. Write down your questions so you remember to ask them during your visits. Our Emergency Department Referral Coordinators will be reaching out to you in the next 24-48 hours from 9:00am to 5:00pm (Monday - Friday) with a follow up appointment. Please expect a phone call from the hospital in that time frame. If you do not receive a call or if you have any questions or concerns, you can reach them at [phone] or [phone].    ~~~  Elbow Fracture    WHAT YOU NEED TO KNOW:    An elbow fracture is a break in one or more of the 3 bones that form your elbow joint. Osteoporosis (brittle bones) can increase your risk for an elbow fracture.    DISCHARGE INSTRUCTIONS:  Return to the emergency department if:   Your skin becomes swollen, cold, or pale.  Your elbow, hand, or fingers are numb.    Call your doctor if:   You have a fever.  The pain gets worse, even after you rest and take your medicine.  You have new or more trouble moving your arm.  You have new sores around the area of your brace, splint, or cast.  Your brace, splint, or cast becomes damaged.  You have questions or concerns about your condition or care.    Medicines: You may need any of the following:     Prescription pain medicine may be given. Ask your healthcare provider how to take this medicine safely. Some prescription pain medicines contain acetaminophen. Do not take other medicines that contain acetaminophen without talking to your healthcare provider. Too much acetaminophen may cause liver damage. Prescription pain medicine may cause constipation. Ask your healthcare provider how to prevent or treat constipation.     NSAIDs, such as ibuprofen, help decrease swelling, pain, and fever. This medicine is available with or without a doctor's order. NSAIDs can cause stomach bleeding or kidney problems in certain people. If you take blood thinner medicine, always ask your healthcare provider if NSAIDs are safe for you. Always read the medicine label and follow directions.    Take your medicine as directed. Contact your healthcare provider if you think your medicine is not helping or if you have side effects. Tell him or her if you are allergic to any medicine. Keep a list of the medicines, vitamins, and herbs you take. Include the amounts, and when and why you take them. Bring the list or the pill bottles to follow-up visits. Carry your medicine list with you in case of an emergency.    Self-care:     Elevate your elbow above the level of your heart as often as you can. This will help decrease swelling and pain. Prop your elbow on pillows or blankets to keep it elevated comfortably. While your elbow is elevated, wiggle your fingers and open and close them to prevent hand stiffness.    Apply ice on your elbow on your elbow for 15 to 20 minutes every hour or as directed. Use an ice pack, or put crushed ice in a plastic bag. Cover it with a towel. Ice helps prevent tissue damage and decreases swelling and pain.    Go to physical therapy as directed. A physical therapist can teach you exercises to help improve movement and strength and to decrease pain.    Care for your brace, cast, or splint: Follow instructions about when you may take a bath or shower. It is important not to get your brace, cast, or splint wet. Cover your device with a plastic bag before you bathe. Tape the bag to your skin above the device to help keep out water. Hold your elbow away from the water in case the bag breaks.    Check the skin around your cast, brace, or splint daily for any redness or open skin.    Do not use a sharp or pointed object to scratch your skin under the cast, brace, or splint.    Do not remove your brace or splint unless directed.    Follow up with your doctor as directed: You may need to see a specialist. You may need more x-rays and treatment. Write down your questions so you remember to ask them during your visits. Our Emergency Department Referral Coordinators will be reaching out to you in the next 24-48 hours from 9:00am to 5:00pm (Monday - Friday) with a follow up appointment. Please expect a phone call from the hospital in that time frame. If you do not receive a call or if you have any questions or concerns, you can reach them at [phone] or [phone].    ~~~  Elbow Fracture    WHAT YOU NEED TO KNOW:    An elbow fracture is a break in one or more of the 3 bones that form your elbow joint. Osteoporosis (brittle bones) can increase your risk for an elbow fracture.    DISCHARGE INSTRUCTIONS:  Return to the emergency department if:   Your skin becomes swollen, cold, or pale.  Your elbow, hand, or fingers are numb.    Call your doctor if:   You have a fever.  The pain gets worse, even after you rest and take your medicine.  You have new or more trouble moving your arm.  You have new sores around the area of your brace, splint, or cast.  Your brace, splint, or cast becomes damaged.  You have questions or concerns about your condition or care.    Medicines: You may need any of the following:     Prescription pain medicine may be given. Ask your healthcare provider how to take this medicine safely. Some prescription pain medicines contain acetaminophen. Do not take other medicines that contain acetaminophen without talking to your healthcare provider. Too much acetaminophen may cause liver damage. Prescription pain medicine may cause constipation. Ask your healthcare provider how to prevent or treat constipation.     NSAIDs, such as ibuprofen, help decrease swelling, pain, and fever. This medicine is available with or without a doctor's order. NSAIDs can cause stomach bleeding or kidney problems in certain people. If you take blood thinner medicine, always ask your healthcare provider if NSAIDs are safe for you. Always read the medicine label and follow directions.    Take your medicine as directed. Contact your healthcare provider if you think your medicine is not helping or if you have side effects. Tell him or her if you are allergic to any medicine. Keep a list of the medicines, vitamins, and herbs you take. Include the amounts, and when and why you take them. Bring the list or the pill bottles to follow-up visits. Carry your medicine list with you in case of an emergency.    Self-care:     Elevate your elbow above the level of your heart as often as you can. This will help decrease swelling and pain. Prop your elbow on pillows or blankets to keep it elevated comfortably. While your elbow is elevated, wiggle your fingers and open and close them to prevent hand stiffness.    Apply ice on your elbow on your elbow for 15 to 20 minutes every hour or as directed. Use an ice pack, or put crushed ice in a plastic bag. Cover it with a towel. Ice helps prevent tissue damage and decreases swelling and pain.    Go to physical therapy as directed. A physical therapist can teach you exercises to help improve movement and strength and to decrease pain.    Care for your brace, cast, or splint: Follow instructions about when you may take a bath or shower. It is important not to get your brace, cast, or splint wet. Cover your device with a plastic bag before you bathe. Tape the bag to your skin above the device to help keep out water. Hold your elbow away from the water in case the bag breaks.    Check the skin around your cast, brace, or splint daily for any redness or open skin.    Do not use a sharp or pointed object to scratch your skin under the cast, brace, or splint.    Do not remove your brace or splint unless directed.    Follow up with your doctor as directed: You may need to see a specialist. You may need more x-rays and treatment. Write down your questions so you remember to ask them during your visits.

## 2023-07-18 NOTE — ED PROVIDER NOTE - OBJECTIVE STATEMENT
57 past medical history presenting with mechanical fall right elbow and head.  Patient states that she was in bathtub wearing " fuzzy socks when she got up out of the bathtub she slipped and fell onto RT elbow.  Denies syncope vomiting vision changes chest pain shortness of breath difficulty breathing.  Patient says 5 days ago she fractured her right elbow and has been following up with Dr. Gregg for this.  Currently not wearing a splint or a sling.  Admits to pain right shoulder movement.

## 2023-07-18 NOTE — ED PROVIDER NOTE - NSFOLLOWUPCLINICS_GEN_ALL_ED_FT
[facility]  [facility]  [address], [place]   Phone: [phone]  Fax:   Follow Up Time: 1-3 Days     [facility]  [facility]  [address], [place]   Phone: [phone]  Fax:   Follow Up Time: 1-3 Days     [facility]  [facility]  [address], [place]   Phone: [phone]  Fax:   Follow Up Time: 1-3 Days

## 2023-07-18 NOTE — ED PROVIDER NOTE - PHYSICAL EXAMINATION
CONSTITUTIONAL: NAD  SKIN: Warm dry  HEAD: NCAT  EYES: NL inspection  ENT: MMM  NECK: Supple; non tender.  CARD: RRR  RESP: CTAB  ABD: S/NT no R/G  EXT: no pedal edema  +RT elbow limited ROM, sensation intact, no clavicle ttp bl  No mildine spinal ttp  No echymossis, lesions, lacerations on back   NEURO: Grossly unremarkable  PSYCH: Cooperative, appropriate.

## 2023-07-18 NOTE — ED PROVIDER NOTE - NSFOLLOWUPCLINICSTOKEN_GEN_ALL_ED_FT
800843:1-3 Days|| ||00\01||False; 798856:1-3 Days|| ||00\01||False; 049396:1-3 Days|| ||00\01||False;

## 2023-07-18 NOTE — ED PROVIDER NOTE - PATIENT PORTAL LINK FT
You can access the FollowMyHealth Patient Portal offered by [facility] by registering at the following website: [email or web address]. By joining [employer]’s FollowMyHealth portal, you will also be able to view your health information using other applications (apps) compatible with our system. You can access the FollowMyHealth Patient Portal offered by [facility] by registering at the following website: [email or web address]. By joining [employer]’s FollowMyHealth portal, you will also be able to view your health information using other applications (apps) compatible with our system. You can access the FollowMyHealth Patient Portal offered by [facility] by registering at the following website: [email or web address]. By joining [facility]’s FollowMyHealth portal, you will also be able to view your health information using other applications (apps) compatible with our system.

## 2023-07-18 NOTE — ED PROVIDER NOTE - CLINICAL SUMMARY MEDICAL DECISION MAKING FREE TEXT BOX
57-year-old female with mechanical fall without LOC.  Patient is neurologically intact, vital signs are normal.  Imaging ordered, right radial head fracture noted on x-ray (patient was already diagnosed with elbow fracture previously), sugar-tong splint applied, normal neurovascular exam post splinting.  Patient was given analgesia in ED, reported feeling much better; she already has  a follow-up appointment with an orthopedist to address elbow fracture.  Stricter precautions given, patient given permission to ask questions, amenable with discharge plan.

## 2023-07-18 NOTE — ED PROVIDER NOTE - PROGRESS NOTE DETAILS
SS S/p sugar tong splint and sling, Patient to be discharged from ED. Any available test results were discussed with patient and/or family. Verbal instructions given, including instructions to return to ED immediately for any new, worsening, or concerning symptoms. Patient endorsed understanding. Written discharge instructions additionally given, including follow-up plan.

## 2023-07-18 NOTE — ED PROVIDER NOTE - ATTENDING CONTRIBUTION TO CARE
57-year-old female without any chronic medical problems presenting for evaluation status post fall in her bathtub around 2PM today.  Patient states she slipped, fell hit her right elbow which, she states she fractured several days ago, and hit the back of her head.  She denies any loss of consciousness, got out of the bathtub by herself, has been ambulating since. Denies any blurry or double vision, neck pain, chest pain or shortness of breath, no vomiting, no lower extremity pain, no difficulties ambulating.  Did not take anything for pain.  States she is supposed to see Dr. Tesfaye regarding her fractured elbow.  Not wearing a splint. Well-appearing middle-aged female sitting on the stretcher in no acute distress, head atraumatic/normocephalic, PERRL, pink conjunctiva, no midline spine TTP, no chest wall TTP, speaking full sentences, extremities are well-perfused, there is tenderness to palpation over the right deltoid, no shoulder deformities, able to range the shoulder, limited range of motion at the right elbow with focal tenderness to palpation, no deformities, normal wrist/forearm exam, extremities neurovascular intact, patient is awake and alert, no gross neurodeficits, ambulating without difficulties.  Impression/plan: most likely contusion, will obtain extremity x-rays, ice was applied, analgesic given.  Patient advised to is amenable to plan.

## 2023-07-20 ENCOUNTER — EMERGENCY (EMERGENCY)
Facility: HOSPITAL | Age: 58
LOS: 0 days | Discharge: HOME | End: 2023-07-20
Attending: STUDENT IN AN ORGANIZED HEALTH CARE EDUCATION/TRAINING PROGRAM
Payer: MEDICAID

## 2023-07-20 VITALS
RESPIRATION RATE: 18 BRPM | DIASTOLIC BLOOD PRESSURE: 64 MMHG | OXYGEN SATURATION: 99 % | HEART RATE: 72 BPM | SYSTOLIC BLOOD PRESSURE: 121 MMHG

## 2023-07-20 VITALS
SYSTOLIC BLOOD PRESSURE: 117 MMHG | TEMPERATURE: 98 F | OXYGEN SATURATION: 98 % | DIASTOLIC BLOOD PRESSURE: 63 MMHG | HEART RATE: 78 BPM | WEIGHT: 179.9 LBS | RESPIRATION RATE: 20 BRPM

## 2023-07-20 DIAGNOSIS — M79.641 PAIN IN RIGHT HAND: ICD-10-CM

## 2023-07-20 DIAGNOSIS — F41.9 ANXIETY DISORDER, UNSPECIFIED: ICD-10-CM

## 2023-07-20 DIAGNOSIS — M79.671 PAIN IN RIGHT FOOT: ICD-10-CM

## 2023-07-20 DIAGNOSIS — Z98.82 BREAST IMPLANT STATUS: Chronic | ICD-10-CM

## 2023-07-20 DIAGNOSIS — Z87.39 PERSONAL HISTORY OF OTHER DISEASES OF THE MUSCULOSKELETAL SYSTEM AND CONNECTIVE TISSUE: Chronic | ICD-10-CM

## 2023-07-20 DIAGNOSIS — M79.642 PAIN IN LEFT HAND: ICD-10-CM

## 2023-07-20 DIAGNOSIS — R20.2 PARESTHESIA OF SKIN: ICD-10-CM

## 2023-07-20 DIAGNOSIS — R73.03 PREDIABETES: ICD-10-CM

## 2023-07-20 DIAGNOSIS — E03.9 HYPOTHYROIDISM, UNSPECIFIED: ICD-10-CM

## 2023-07-20 DIAGNOSIS — M25.521 PAIN IN RIGHT ELBOW: ICD-10-CM

## 2023-07-20 DIAGNOSIS — E78.5 HYPERLIPIDEMIA, UNSPECIFIED: ICD-10-CM

## 2023-07-20 DIAGNOSIS — Z90.49 ACQUIRED ABSENCE OF OTHER SPECIFIED PARTS OF DIGESTIVE TRACT: Chronic | ICD-10-CM

## 2023-07-20 PROCEDURE — 99284 EMERGENCY DEPT VISIT MOD MDM: CPT

## 2023-07-20 PROCEDURE — 82962 GLUCOSE BLOOD TEST: CPT

## 2023-07-20 PROCEDURE — 73630 X-RAY EXAM OF FOOT: CPT | Mod: 26,RT

## 2023-07-20 PROCEDURE — 73130 X-RAY EXAM OF HAND: CPT | Mod: 50

## 2023-07-20 PROCEDURE — 99283 EMERGENCY DEPT VISIT LOW MDM: CPT | Mod: 25

## 2023-07-20 PROCEDURE — 73630 X-RAY EXAM OF FOOT: CPT | Mod: RT

## 2023-07-20 PROCEDURE — 73130 X-RAY EXAM OF HAND: CPT | Mod: 26,50

## 2023-07-20 PROCEDURE — 96372 THER/PROPH/DIAG INJ SC/IM: CPT

## 2023-07-20 RX ORDER — MELOXICAM 15 MG/1
1 TABLET ORAL
Qty: 30 | Refills: 0
Start: 2023-07-20 | End: 2023-08-18

## 2023-07-20 RX ORDER — KETOROLAC TROMETHAMINE 30 MG/ML
30 SYRINGE (ML) INJECTION ONCE
Refills: 0 | Status: DISCONTINUED | OUTPATIENT
Start: 2023-07-20 | End: 2023-07-20

## 2023-07-20 RX ADMIN — Medication 30 MILLIGRAM(S): at 09:59

## 2023-07-20 RX ADMIN — Medication 30 MILLIGRAM(S): at 09:29

## 2023-07-20 NOTE — ED ADULT NURSE REASSESSMENT NOTE - NS ED NURSE REASSESS COMMENT FT1
received report from prior RN, Pt have a right arm cast on elbow to wrist, fingers are warm, +pulse. no s/s of distress.

## 2023-07-20 NOTE — ED ADULT TRIAGE NOTE - ARRIVAL FROM
INFECTIOUS DISEASE PROGRESS NOTE     Patient encounter during the COVID-19 pandemic crisis    Melina Hills is a 66 year old female patient admitted on 2021.  Reason for admission: SBO (small bowel obstruction) (CMS/[facility]) [K56.609]  ESRD (end stage renal disease) (CMS/[facility]) [N18.6]  Elevated troponin [R77.8]  Abnormal LFTs [R94.5]    Current Medications  Current Facility-Administered Medications   Medication   • docusate sodium (ENEMEEZ MINI) 283 MG rectal enema 283 mg   • [START ON 2021] NIFEdipine XL (PROCARDIA XL) ER tablet 60 mg   • dextrose 5 % / sodium chloride 0.9% infusion   • diphenhydrAMINE (BENADRYL) injection 12.5 mg   • VANCOMYCIN - PHARMACIST MONITORED Misc   • vancomycin (VANCOCIN) 500 mg in sodium chloride 0.9 % 100 mL IVPB   • metoPROLOL tartrate (LOPRESSOR) tablet 50 mg   • piperacillin-tazobactam (ZOSYN) 3.375 g in sodium chloride 0.9 % 100 mL IVPB   • acetaminophen (OFIRMEV) IV solution 1,000 mg   • acetaminophen (TYLENOL) suppository 650 mg   • pantoprazole (PROTONIX INJECT) injection 40 mg   • bisacodyl (DULCOLAX) suppository 10 mg   • sodium chloride (NORMAL SALINE) 0.9 % bolus 100-200 mL   • lidocaine viscous 2 % oral solution 15 mL   • heparin (porcine) injection 5,000 Units   • acetaminophen (TYLENOL) tablet 500 mg   • aluminum-magnesium hydroxide-simethicone (MAALOX) 200-200-20 MG/5ML suspension 30 mL   • diphenhydrAMINE (BENADRYL) capsule 25 mg   • docusate sodium (COLACE) 50 MG/5ML liquid 100 mg   • guaiFENesin-DM) (ROBITUSSIN DM) 100-10 MG/5ML syrup 5 mL   • hydrALAZINE (APRESOLINE) injection 10 mg   • LORazepam (ATIVAN) injection 1 mg   • morphine injection 2 mg   • ondansetron (ZOFRAN) injection 4 mg   • aspirin tablet 325 mg      SUBJECTIVE / OBJECTIVE   Reviewed.    Physical Exam:   Vitals: Minimum/Maximum (last 24 hours):  Temperature Blood Pressure Heart Rate Resp Rate SpO2   Temp  Av.1 °F (36.7 °C)  Min: 97.9 °F (36.6 °C)  Max: 98.4 °F (36.9 °C) BP  Min: 152/83   Max: 180/57 Pulse  Av.7  Min: 70  Max: 84 Resp  Av.7  Min: 16  Max: 20 SpO2  Av.5 %  Min: 97 %  Max: 100 %   Last Vitals:  Visit Vitals  BP (!) 172/96 (BP Location: RUE - Right upper extremity, Patient Position: Supine)   Pulse 73   Temp 97.9 °F (36.6 °C) (Oral)   Resp 20   Ht 5' 2\" (1.575 m)   Wt 55.8 kg (123 lb)   LMP  (LMP Unknown)   SpO2 98%   BMI 22.50 kg/m²       General appearance:  In no distress  HEENT:  Normocephalic  Lung:  Air entry is satisfactory and clear to auscultation bilaterally  Heart:  S1 and S2 heard  Abdomen:  Soft, non-distended. No obvious masses or organomegaly. No tenderness  Neurological: Awake, alert and appropriate    Lab Results     Recent Labs     21  0827 21  0939   WBC 4.3 4.5 3.7*   RBC 3.36* 3.83* 3.75*   HGB 9.2* 10.4* 10.2*   HCT 31.1* 35.0* 34.0*    207 252   MCV 92.6 91.4 90.7   MCH 27.4 27.2 27.2   MCHC 29.6* 29.7* 30.0*   NRBCRE 0 0 0       Recent Labs     21  0827 21  0938   SODIUM 131* 132* 134*   POTASSIUM 3.0* 3.5 3.5   CO2 25 24 24   ANIONGAP 15 18 18   GLUCOSE 208* 118* 91   BUN 27* 31* 38*   CREATININE 2.14* 2.43* 2.86*   BCRAT 13 13 13   CALCIUM 9.9 10.4* 10.3*   BILIRUBIN 0.4  --   --    AST 14  --   --    GPT 6  --   --    ALKPT 599*  --   --    GLOB 4.5*  --   --    AGR 0.4*  --   --      TRANSESOPHAGEAL ECHO (JIM) W/ W/O IMAGING AGENT  STUDY CONCLUSIONS  SUMMARY:  1. Left ventricle: The cavity size is normal. Wall thickness is normal.     The ejection fraction was measured by visual estimation. The ejection     fraction is 60%.  2. There is a stationary, cylindrical echogenicity seen in the SVC.    FINDINGS  LEFT VENTRICLE:  The cavity size is normal. Wall thickness is normal.  Systolic function is normal.    The ejection fraction was measured by  visual estimation. The ejection fraction is 60%.  AORTIC VALVE:  The annulus is normal. Structurally normal valve. The valve  is trileaflet.  The leaflets are normal thickness. Cusp separation is  normal.  There is no evidence of a vegetation.  Doppler:   There is no  stenosis.    No regurgitation.  AORTA:  Aortic root: The aortic root is normal in size.  Ascending aorta: The ascending aorta is normal in size.  MITRAL VALVE:   Structurally normal valve. The annulus is normal. The  leaflets are normal thickness. Leaflet separation is normal.  There is no  evidence of a vegetation.  Doppler:   There is no evidence for stenosis.   No regurgitation.  ATRIAL SEPTUM:  The septum is normal.  Color doppler shows no obvious shunt.  LEFT ATRIUM:  Well visualized. The atrium is normal in size.  There is no  evidence of a thrombus in the atrial cavity or appendage. No spontaneous  echo contrast is observed. The appendage is well visualized,  morphologically a left appendage, and of normal size. Emptying velocity is  normal.  RIGHT VENTRICLE:  The cavity size is normal. Wall thickness is normal.  Systolic function is normal.  VENTRICULAR SEPTUM:   Thickness is normal.  Normal septal motion.  Normal contour.    There is no evidence of a ventricular septal defect.  PULMONIC VALVE:    Structurally normal valve.   Cusp separation is normal.   There is no evidence of a vegetation.  TRICUSPID VALVE:   Structurally normal valve.   Leaflet separation is  normal.  There is no evidence of a vegetation.  Doppler:   Mild-moderate  regurgitation.  RIGHT ATRIUM:  The atrium is normal in size.  There is no evidence of a  thrombus in the atrial cavity or appendage.  PERICARDIUM:  The pericardium is normal in appearance. There is no  pericardial effusion.  12/28/2021 15:44    ASSESSMENT   Sepsis with Central line associated with MRSA bloodstream infection.  S/p Removal of permcath  ?Intraabdominal sepsis vs due to constipation vs SBO  Septated hepatic cyst  DM2  CVA  CAD/MI  ESRD on HD    PLAN   Reviewed  Follow repeat blood cultures  IV vancomycin plus Zosyn  Will reevaluate    Thank  you for requesting my participation in the care of this patient    This note was partially generated using voice recognition software.  If you require clarification or   feel that there has been an error in the note please contact me through [employer].  Thank you.      Dre Price MD  12/28/2021   6:34 PM         Home

## 2023-07-20 NOTE — ED PROVIDER NOTE - CARE PLAN
1 Principal Discharge DX:	Pain in both hands  Secondary Diagnosis:	Pain, joint, foot, right   Principal Discharge DX:	Pain in both hands  Secondary Diagnosis:	Pain, joint, foot, right  Secondary Diagnosis:	Anxiety

## 2023-07-20 NOTE — ED PROVIDER NOTE - NSFOLLOWUPCLINICSTOKEN_GEN_ALL_ED_FT
622756:7-10 Days|| ||00\01||False;255973:7-10 Days|| ||00\01||False; 007589:7-10 Days|| ||00\01||False;288464:7-10 Days|| ||00\01||False; 741038:7-10 Days|| ||00\01||False;498326:7-10 Days|| ||00\01||False;

## 2023-07-20 NOTE — ED PROVIDER NOTE - NSFOLLOWUPCLINICS_GEN_ALL_ED_FT
[facility]  Podiatry  .  [place]   Phone: [phone]  Fax:   Follow Up Time: 7-10 Days    [facility]  Dental  [address] [place] [address]  Phone: [phone]  Fax:   Follow Up Time: 7-10 Days     [facility]  Podiatry  .  [place]   Phone: [phone]  Fax:   Follow Up Time: 7-10 Days    [facility]  Dental  [address] [place] [address]  Phone: [phone]  Fax:   Follow Up Time: 7-10 Days     [facility]  Podiatry  .  [place]   Phone: [phone]  Fax:   Follow Up Time: 7-10 Days    [facility]  Dental  [address] [place] [address]  Phone: [phone]  Fax:   Follow Up Time: 7-10 Days

## 2023-07-20 NOTE — ED ADULT NURSE NOTE - NSFALLUNIVINTERV_ED_ALL_ED
Bed/Stretcher in lowest position, wheels locked, appropriate side rails in place/Call bell, personal items and telephone in reach/Instruct patient to call for assistance before getting out of bed/chair/stretcher/Non-slip footwear applied when patient is off stretcher/[place] to call system/Physically safe environment - no spills, clutter or unnecessary equipment/Purposeful proactive rounding/Room/bathroom lighting operational, light cord in reach Bed/Stretcher in lowest position, wheels locked, appropriate side rails in place/Call bell, personal items and telephone in reach/Instruct patient to call for assistance before getting out of bed/chair/stretcher/Non-slip footwear applied when patient is off stretcher/[place] to call system/Physically safe environment - no spills, clutter or unnecessary equipment/Purposeful proactive rounding/Room/bathroom lighting operational, light cord in reach Bed/Stretcher in lowest position, wheels locked, appropriate side rails in place/Call bell, personal items and telephone in reach/Instruct patient to call for assistance before getting out of bed/chair/stretcher/Non-slip footwear applied when patient is off stretcher/[place] to call system/Physically safe environment - no spills, clutter or unnecessary equipment/Purposeful proactive rounding/Room/bathroom lighting operational, light cord in reach

## 2023-07-20 NOTE — ED PROVIDER NOTE - OBJECTIVE STATEMENT
57-year-old female past medical history of hypothyroidism multiple psychiatric complaints hyperlipidemia coming in here for multiple medical problems.  Patient complaining of primarily tingling pain in both arms as well as right foot pain.  Also had a recent procedure where cast was placed on one of her teeth.  Requesting follow-up with dental clinic.  No other complaints.  No fevers chills nausea vomiting recent falls or trauma.

## 2023-07-20 NOTE — ED PROVIDER NOTE - PHYSICAL EXAMINATION
CONSTITUTIONAL:  in no acute distress.   SKIN: warm, dry  HEAD: Normocephalic; atraumatic.  EYES: PERRL, EOMI, normal sclera and conjunctiva   ENT: No nasal discharge; airway clear.  NECK: Supple; non tender.  CARD:  Regular rate and rhythm.   RESP: NO inc WOB   ABD: soft ntnd  EXT: Normal ROM.  right arm cast in place, neurovascuallry intact  LYMPH: No acute cervical adenopathy.  NEURO: Alert, oriented, grossly unremarkable  PSYCH: Cooperative, appropriate.

## 2023-07-20 NOTE — ED PROVIDER NOTE - CLINICAL SUMMARY MEDICAL DECISION MAKING FREE TEXT BOX
57-year-old female presents to ER for multiple complaint.  Patient was seen in the ED July 18 complaining of right elbow pain from a fall that she had before that was seen at [facility] and then came to the ED during the visit on July 18 she had a fracture was placed in a splint and recommended Ortho follow-up.  Patient noticed her fingers were swelling a little bit has not been wearing her sling which is what prompted the visit patient also states several weeks ago a window hit her left fourth digit and she never received imaging for that today she also noticed some pain to her right foot nontraumatic has a history of prediabetes states had blood work done 3 to 4 months ago and is requesting to make sure she is not diabetic vs reviewed FS obtained imaging obtained and reviewed no fx. Patient a spoken to in detail about results  All questions addressed.  Results of ED work up discussed  Patient has proper follow up. Return precautions given. 57-year-old female presents to ER for multiple complaint.  Patient was seen in the ED July 18 complaining of right elbow pain from a fall that she had before that was seen at [facility] and then came to the ED during the visit on July 18 she had a fracture was placed in a splint and recommended Ortho follow-up.  Patient noticed her fingers were swelling a little bit has not been wearing her sling which is what prompted the visit patient also states several weeks ago a window hit her left fourth digit and she never received imaging for that today she also noticed some pain to her right foot nontraumatic has a history of prediabetes states had blood work done 3 to 4 months ago and is requesting to make sure she is not diabetic vs reviewed FS obtained imaging obtained and reviewed no fx. Patient a spoken to in detail about results  All questions addressed.  Results of ED work up discussed  Patient has proper follow up. Return precautions given. 57-year-old female presents to ER for multiple complaint.  Patient was seen in the ED July 18 complaining of right elbow pain from a fall that she had before that was seen at [facility] and then came to the ED during the visit on July 18 she had a fracture was placed in a splint and recommended Ortho follow-up.  Patient noticed her fingers were swelling a little bit has not been wearing her sling which is what prompted the visit patient also states several weeks ago a window hit her left fourth digit and she never received imaging for that today she also noticed some pain to her right foot nontraumatic has a history of prediabetes states had blood work done 3 to 4 months ago and is requesting to make sure she is not diabetic vs reviewed FS obtained imaging obtained and reviewed no fx. Patient a spoken to in detail about results  All questions addressed.  Results of ED work up discussed  Patient has proper follow up. Return precautions given.

## 2023-07-20 NOTE — ED PROVIDER NOTE - PATIENT PORTAL LINK FT
You can access the FollowMyHealth Patient Portal offered by [facility] by registering at the following website: [email or web address]. By joining [employer]’s FollowMyHealth portal, you will also be able to view your health information using other applications (apps) compatible with our system. You can access the FollowMyHealth Patient Portal offered by [facility] by registering at the following website: [email or web address]. By joining [employer]’s FollowMyHealth portal, you will also be able to view your health information using other applications (apps) compatible with our system. You can access the FollowMyHealth Patient Portal offered by [facility] by registering at the following website: [email or web address]. By joining [employer]’s FollowMyHealth portal, you will also be able to view your health information using other applications (apps) compatible with our system.

## 2023-07-20 NOTE — ED PROVIDER NOTE - ATTENDING CONTRIBUTION TO CARE
I personally evaluated the patient. I reviewed the Resident’s or Physician Assistant’s note (as assigned above), and agree with the findings and plan except as documented in my note.  57-year-old female presents to ER for multiple complaint.  Patient was seen in the ED July 18 complaining of right elbow pain from a fall that she had before that was seen at [facility] and then came to the ED during the visit on July 18 she had a fracture was placed in a splint and recommended Ortho follow-up.  Patient noticed her fingers were swelling a little bit has not been wearing her sling which is what prompted the visit patient also states several weeks ago a window hit her left fourth digit and she never received imaging for that today she also noticed some pain to her right foot nontraumatic has a history of prediabetes states had blood work done 3 to 4 months ago and is requesting to make sure she is not diabetic  CONSTITUTIONAL: WA / WN / NAD  HEAD: NCAT  EYES: PERRL; EOMI;   NECK: Supple  MSK/EXT: +right extremity in splint. full ROM of right fingers good capillary refill , left 4th digit full ROM good capillary refill. ttp right dorsum of foot DP/PT pulses in tact  SKIN: Warm and dry;   NEURO: AAOx3  PSYCH: Memory Intact, Normal Affect I personally evaluated the patient. I reviewed the Resident’s or Physician Assistant’s note (as assigned above), and agree with the findings and plan except as documented in my note.  57-year-old female presents to ER for multiple complaint.  Patient was seen in the ED July 18 complaining of right elbow pain from a fall that she had before that was seen at [email or web address] and then came to the ED during the visit on July 18 she had a fracture was placed in a splint and recommended Ortho follow-up.  Patient noticed her fingers were swelling a little bit has not been wearing her sling which is what prompted the visit patient also states several weeks ago a window hit her left fourth digit and she never received imaging for that today she also noticed some pain to her right foot nontraumatic has a history of prediabetes states had blood work done 3 to 4 months ago and is requesting to make sure she is not diabetic  CONSTITUTIONAL: WA / WN / NAD  HEAD: NCAT  EYES: PERRL; EOMI;   NECK: Supple  MSK/EXT: +right extremity in splint. full ROM of right fingers good capillary refill , left 4th digit full ROM good capillary refill. ttp right dorsum of foot DP/PT pulses in tact  SKIN: Warm and dry;   NEURO: AAOx3  PSYCH: Memory Intact, Normal Affect I personally evaluated the patient. I reviewed the Resident’s or Physician Assistant’s note (as assigned above), and agree with the findings and plan except as documented in my note.  57-year-old female presents to ER for multiple complaint.  Patient was seen in the ED July 18 complaining of right elbow pain from a fall that she had before that was seen at [facility] and then came to the ED during the visit on July 18 she had a fracture was placed in a splint and recommended Ortho follow-up.  Patient noticed her fingers were swelling a little bit has not been wearing her sling which is what prompted the visit patient also states several weeks ago a window hit her left fourth digit and she never received imaging for that today she also noticed some pain to her right foot nontraumatic has a history of prediabetes states had blood work done 3 to 4 months ago and is requesting to make sure she is not diabetic  CONSTITUTIONAL: WA / WN / NAD  HEAD: NCAT  EYES: PERRL; EOMI;   NECK: Supple  MSK/EXT: +right extremity in splint. full ROM of right fingers good capillary refill , left 4th digit full ROM good capillary refill. ttp right dorsum of foot DP/PT pulses in tact  SKIN: Warm and dry;   NEURO: AAOx3  PSYCH: Memory Intact, Normal Affect

## 2023-07-20 NOTE — ED PROVIDER NOTE - PROGRESS NOTE DETAILS
Resident MDM: 57-year-old female here with no medical complaints.  Including hand pain foot pain and concern for dental injury.  Vital signs reviewed.  Physical exam–no obvious injuries to extremities neurovascular intact.  No dental injury/abscess/tolerating p.o. secretions.  We will follow-up with PMD and dental clinic and podiatry respectively.  Has appointment with Ortho-Authored by Mariano Abraham

## 2023-07-20 NOTE — ED ADULT NURSE NOTE - OBJECTIVE STATEMENT
Pt is a 57 year old female c/o generalized pain. Pt states she feels pain on her right elbow that radiates down to her fingers and also feels pain on b/l feet stating "I feel a circular pressure on my foot". Pt is a&o x3 in no acute distress.

## 2023-09-20 NOTE — ED ADULT NURSE NOTE - CAS TRG GEN SKIN CONDITION
Outreach attempt was made to schedule a Medicare Wellness Visit. This was the first attempt. Contact was not made, Nursing Home patient.    
Warm/Dry

## 2023-12-14 ENCOUNTER — APPOINTMENT (OUTPATIENT)
Dept: SURGERY | Facility: CLINIC | Age: 58
End: 2023-12-14
Payer: MEDICAID

## 2023-12-14 VITALS — WEIGHT: 187 LBS | HEIGHT: 64 IN | BODY MASS INDEX: 31.92 KG/M2

## 2023-12-14 DIAGNOSIS — M62.08 SEPARATION OF MUSCLE (NONTRAUMATIC), OTHER SITE: ICD-10-CM

## 2023-12-14 DIAGNOSIS — F17.210 NICOTINE DEPENDENCE, CIGARETTES, UNCOMPLICATED: ICD-10-CM

## 2023-12-14 PROCEDURE — 99203 OFFICE O/P NEW LOW 30 MIN: CPT

## 2023-12-14 NOTE — PHYSICAL EXAM
[JVD] : no jugular venous distention  [Normal Breath Sounds] : Normal breath sounds [No Rash or Lesion] : No rash or lesion [Alert] : alert [Calm] : calm [de-identified] : Overweight [de-identified] : Normal [de-identified] : Moderately protuberant abdomen, moderate to large diastasis recti [de-identified] : Large multilobular recurrent incarcerated ventral hernia

## 2023-12-14 NOTE — CONSULT LETTER
[Dear  ___] : Dear  [unfilled], [Courtesy Letter:] : I had the pleasure of seeing your patient, [unfilled], in my office today. [Please see my note below.] : Please see my note below. [Consult Closing:] : Thank you very much for allowing me to participate in the care of this patient.  If you have any questions, please do not hesitate to contact me. [FreeTextEntry3] : Respectfully,  Abundio Jarquin M.D., FACS

## 2023-12-14 NOTE — ASSESSMENT
[FreeTextEntry1] : Sharonda is a pleasant 58-year-old woman with a past medical history significant for hypothyroidism, hypercholesterolemia, asthma and 2  sections along with an appendectomy along with ventral hernia surgery without mesh (at her request) in the past by another surgeon now presenting to the office with pain and swelling in the same area suspicious for recurrent hernia.  She was significantly overweight in the past and has lost approximately 60 pounds slowly over several years.  Her symptoms have worsened in the recent past.  Physical examination demonstrates a large tender orange sized multilobular bulge in the supraumbilical region which is only partially reducible consistent with an incarcerated recurrent ventral hernia warranting surgical repair.  There is no evidence of strangulation, and the patient denies any symptoms of obstruction.  This hernia is complicated by a moderate to large diastasis recti which is likely related to her previous full-term pregnancies in the past and her previous excess abdominal weight.  Her current BMI is 32.  I explained the pros and cons of surgery, as well as all risks, benefits, indications and alternatives of the procedure and the patient understood and agreed.  We had a long discussion with regard to the importance of mesh in this situation to reduce the risk of another recurrent hernia in the future and she has agreed.  Sharonda was scheduled for the repair of her incarcerated recurrent ventral hernia with mesh on 2024 under LOCAL with IV SED[facility] with presurgical testing waived.  She was encouraged to avoid heavy lifting and strenuous activity in the interim, of course.  We also discussed the importance of calorie restriction, healthy eating, and moderate aerobic exercise with regard to weight loss, hernia recurrence, her diastasis recti, and her overall health.  After our discussion, she is aware of the dangers of cigarette smoking, especially with regard to wound healing, wound complications including infection (which could lead to mesh infection), hernia development and hernia recurrence.  She was encouraged to quit or minimize smoking in the perioperative period and has agreed to try.

## 2024-01-05 NOTE — ASU DISCHARGE PLAN (ADULT/PEDIATRIC). - C. MAKE IMPORTANT PERSONAL OR BUSINESS DECISIONS
Patient was notified medication was refill due for labs and follow up appt with provider, patient verbalized understanding. Patient scheduled appt with provider.   Statement Selected

## 2024-01-22 ENCOUNTER — TRANSCRIPTION ENCOUNTER (OUTPATIENT)
Age: 59
End: 2024-01-22

## 2024-01-22 ENCOUNTER — OUTPATIENT (OUTPATIENT)
Dept: OUTPATIENT SERVICES | Facility: HOSPITAL | Age: 59
LOS: 1 days | Discharge: HOME | End: 2024-01-22
Payer: MEDICAID

## 2024-01-22 ENCOUNTER — APPOINTMENT (OUTPATIENT)
Dept: SURGERY | Facility: AMBULATORY SURGERY CENTER | Age: 59
End: 2024-01-22
Payer: MEDICAID

## 2024-01-22 VITALS
RESPIRATION RATE: 16 BRPM | SYSTOLIC BLOOD PRESSURE: 99 MMHG | HEART RATE: 71 BPM | OXYGEN SATURATION: 98 % | HEIGHT: 63 IN | WEIGHT: 179.9 LBS | DIASTOLIC BLOOD PRESSURE: 59 MMHG | TEMPERATURE: 98 F

## 2024-01-22 VITALS
RESPIRATION RATE: 16 BRPM | OXYGEN SATURATION: 95 % | HEART RATE: 75 BPM | SYSTOLIC BLOOD PRESSURE: 106 MMHG | DIASTOLIC BLOOD PRESSURE: 73 MMHG

## 2024-01-22 DIAGNOSIS — Z87.39 PERSONAL HISTORY OF OTHER DISEASES OF THE MUSCULOSKELETAL SYSTEM AND CONNECTIVE TISSUE: Chronic | ICD-10-CM

## 2024-01-22 DIAGNOSIS — K14.9 DISEASE OF TONGUE, UNSPECIFIED: Chronic | ICD-10-CM

## 2024-01-22 DIAGNOSIS — Z98.891 HISTORY OF UTERINE SCAR FROM PREVIOUS SURGERY: Chronic | ICD-10-CM

## 2024-01-22 DIAGNOSIS — Z90.49 ACQUIRED ABSENCE OF OTHER SPECIFIED PARTS OF DIGESTIVE TRACT: Chronic | ICD-10-CM

## 2024-01-22 DIAGNOSIS — Z98.51 TUBAL LIGATION STATUS: Chronic | ICD-10-CM

## 2024-01-22 DIAGNOSIS — Z98.890 OTHER SPECIFIED POSTPROCEDURAL STATES: Chronic | ICD-10-CM

## 2024-01-22 DIAGNOSIS — K43.6 OTHER AND UNSPECIFIED VENTRAL HERNIA WITH OBSTRUCTION, WITHOUT GANGRENE: ICD-10-CM

## 2024-01-22 DIAGNOSIS — Z98.82 BREAST IMPLANT STATUS: Chronic | ICD-10-CM

## 2024-01-22 PROCEDURE — 49618 RPR AA HRN RCR > 10 NCR/STRN: CPT

## 2024-01-22 PROCEDURE — C1781: CPT

## 2024-01-22 RX ORDER — ACETAMINOPHEN 500 MG
650 TABLET ORAL ONCE
Refills: 0 | Status: DISCONTINUED | OUTPATIENT
Start: 2024-01-22 | End: 2024-01-22

## 2024-01-22 RX ORDER — LEVOTHYROXINE SODIUM 125 MCG
1 TABLET ORAL
Qty: 0 | Refills: 0 | DISCHARGE

## 2024-01-22 RX ORDER — HYDROMORPHONE HYDROCHLORIDE 2 MG/ML
0.5 INJECTION INTRAMUSCULAR; INTRAVENOUS; SUBCUTANEOUS
Refills: 0 | Status: DISCONTINUED | OUTPATIENT
Start: 2024-01-22 | End: 2024-01-22

## 2024-01-22 RX ORDER — OXYCODONE HYDROCHLORIDE 5 MG/1
5 TABLET ORAL ONCE
Refills: 0 | Status: DISCONTINUED | OUTPATIENT
Start: 2024-01-22 | End: 2024-01-22

## 2024-01-22 RX ORDER — LIFITEGRAST 50 MG/ML
1 SOLUTION/ DROPS OPHTHALMIC
Qty: 0 | Refills: 0 | DISCHARGE

## 2024-01-22 RX ORDER — ATORVASTATIN CALCIUM 80 MG/1
1 TABLET, FILM COATED ORAL
Qty: 0 | Refills: 0 | DISCHARGE

## 2024-01-22 RX ORDER — TRAMADOL HYDROCHLORIDE 50 MG/1
50 TABLET, COATED ORAL
Qty: 20 | Refills: 0 | Status: ACTIVE | COMMUNITY
Start: 2024-01-22 | End: 1900-01-01

## 2024-01-22 RX ORDER — SODIUM CHLORIDE 9 MG/ML
1000 INJECTION, SOLUTION INTRAVENOUS
Refills: 0 | Status: DISCONTINUED | OUTPATIENT
Start: 2024-01-22 | End: 2024-01-22

## 2024-01-22 RX ORDER — ONDANSETRON 8 MG/1
4 TABLET, FILM COATED ORAL ONCE
Refills: 0 | Status: DISCONTINUED | OUTPATIENT
Start: 2024-01-22 | End: 2024-01-22

## 2024-01-22 RX ADMIN — HYDROMORPHONE HYDROCHLORIDE 0.5 MILLIGRAM(S): 2 INJECTION INTRAMUSCULAR; INTRAVENOUS; SUBCUTANEOUS at 09:16

## 2024-01-22 RX ADMIN — SODIUM CHLORIDE 100 MILLILITER(S): 9 INJECTION, SOLUTION INTRAVENOUS at 09:16

## 2024-01-22 RX ADMIN — HYDROMORPHONE HYDROCHLORIDE 0.5 MILLIGRAM(S): 2 INJECTION INTRAMUSCULAR; INTRAVENOUS; SUBCUTANEOUS at 09:30

## 2024-01-22 NOTE — ASU DISCHARGE PLAN (ADULT/PEDIATRIC) - CARE PROVIDER_API CALL
Abundio Jarquin  Surgery  [address] [place] [id number]  Phone: [phone]  Fax: [phone]  Scheduled Appointment: 01/29/2024 02:40 PM

## 2024-01-22 NOTE — BRIEF OPERATIVE NOTE - NSICDXBRIEFPROCEDURE_GEN_ALL_CORE_FT
PROCEDURES:  Repair of anterior abdominal hernia(s) (ie, epigastric, incisional, ventral, umbilical, Spigelian), 22-Jan-2024 08:54:35  Abundio Jarquin

## 2024-01-22 NOTE — ASU DISCHARGE PLAN (ADULT/PEDIATRIC) - COMMENTS
- You will see [facility] Physician Assistant, Franci Jim, at your postoperative visit noted above.

## 2024-01-22 NOTE — ASU DISCHARGE PLAN (ADULT/PEDIATRIC) - NS MD DC FALL RISK RISK
For information on Fall & Injury Prevention, visit: [email or web address] OR  [email or web address] OR  https://www.cdc.gov/steadi/patient.html

## 2024-01-22 NOTE — BRIEF OPERATIVE NOTE - NSICDXBRIEFPOSTOP_GEN_ALL_CORE_FT
POST-OP DIAGNOSIS:  Recurrent ventral hernia with incarceration 22-Jan-2024 07:13:36  Abundio Jarquin  Incarcerated umbilical hernia 22-Jan-2024 08:55:44  Abundio Jarquin

## 2024-01-22 NOTE — ASU DISCHARGE PLAN (ADULT/PEDIATRIC) - ASU DC SPECIAL INSTRUCTIONSFT
Diet    Eat light on the day of surgery. Nausea and vomiting can occur after anesthesia,   but usually resolve within 24 hours.  Resume normal diet the following day.      Activity    Rest!  No heavy lifting or strenuous activity.    Medications    Ibuprofen (Advil, Motrin), Naproxen (Aleve) and/or Extra-Strength Tylenol for pain.  Tramadol for severe pain only.  Your prescription was sent electronically to your pharmacy.  Remember, Tramadol is a strong narcotic-like pain reliever which can cause drowsiness, upset stomach and constipation.  It should always be taken with food.  You can use stool softener (Mineral Oil) or laxative (MiraLax or Dulcolax) if constipated.  An antibiotic is given during surgery.  No antibiotic needed at home.  Resume all previous medications.  Resume blood thinners the day after surgery unless told otherwise.    Wound Care    Leave surgical dressing in place.  May shower (dressing is waterproof.)  No pool, ocean, lake, hot-tub or bath for 3 weeks. If you were given an abdominal binder, please wear it as much as possible (day & night) for 2 weeks, but you must remove it for showers.  Ice packs to the area intermittently for several days help with pain and swelling.  Bruising (“black and blue”) is common.  Treatment is…Ice & Rest.       - You will see [facility] Physician Assistant, Franci Jim, at your postoperative visit noted below.

## 2024-01-22 NOTE — ASU PATIENT PROFILE, ADULT - FALL HARM RISK - UNIVERSAL INTERVENTIONS
Bed in lowest position, wheels locked, appropriate side rails in place/Call bell, personal items and telephone in reach/Instruct patient to call for assistance before getting out of bed or chair/Non-slip footwear when patient is out of bed/[place] to call system/Physically safe environment - no spills, clutter or unnecessary equipment/Purposeful Proactive Rounding/Room/bathroom lighting operational, light cord in reach

## 2024-01-22 NOTE — BRIEF OPERATIVE NOTE - NSICDXBRIEFPREOP_GEN_ALL_CORE_FT
PRE-OP DIAGNOSIS:  Recurrent ventral hernia with incarceration 22-Jan-2024 07:13:33  Abundio Jarquin

## 2024-01-26 DIAGNOSIS — N73.6 FEMALE PELVIC PERITONEAL ADHESIONS (POSTINFECTIVE): ICD-10-CM

## 2024-01-26 DIAGNOSIS — K43.0 INCISIONAL HERNIA WITH OBSTRUCTION, WITHOUT GANGRENE: ICD-10-CM

## 2024-01-26 DIAGNOSIS — K42.0 UMBILICAL HERNIA WITH OBSTRUCTION, WITHOUT GANGRENE: ICD-10-CM

## 2024-01-26 DIAGNOSIS — Z90.49 ACQUIRED ABSENCE OF OTHER SPECIFIED PARTS OF DIGESTIVE TRACT: ICD-10-CM

## 2024-01-26 DIAGNOSIS — E03.9 HYPOTHYROIDISM, UNSPECIFIED: ICD-10-CM

## 2024-01-26 DIAGNOSIS — J45.909 UNSPECIFIED ASTHMA, UNCOMPLICATED: ICD-10-CM

## 2024-01-29 ENCOUNTER — APPOINTMENT (OUTPATIENT)
Dept: SURGERY | Facility: CLINIC | Age: 59
End: 2024-01-29
Payer: MEDICAID

## 2024-01-29 DIAGNOSIS — K43.0 INCISIONAL HERNIA WITH OBSTRUCTION, W/OUT GANGRENE: ICD-10-CM

## 2024-01-29 DIAGNOSIS — E66.09 OTHER OBESITY DUE TO EXCESS CALORIES: ICD-10-CM

## 2024-01-29 DIAGNOSIS — K42.0 UMBILICAL HERNIA WITH OBSTRUCTION, W/OUT GANGRENE: ICD-10-CM

## 2024-01-29 PROCEDURE — 99213 OFFICE O/P EST LOW 20 MIN: CPT

## 2024-01-29 NOTE — ASSESSMENT
[FreeTextEntry1] : STEPHANIE HORAN underwent a repair of multifocal recurrent incarcerated ventral hernia and incarcerated umbilical hernia repair with mesh with Dr. Jarquin on 1/22/24  under local IV sedation without any problems or complications. Her wound is clean, dry and intact. There is no evidence of erythema or infection.  She does have a small fluid collection slightly above the main incision, but was counseled that it will resolve spontaneously over time. She is tolerating a diet and having normal bowel movements. She is still experiencing moderate postoperative pain at this time. She has concerns about pressure like feeling slightly below the level of her sternum, which is likely due to compression from the binder and I recommended a follow up with her PCP if the symptom does not resolve or worsens over the next couple of days.     She was counseled and reassured. STEPHANIE was discharged from the office with no specific follow up necessary, but she knows to avoid any heavy lifting or strenuous activity for the next several weeks.  Although she wore the binder to the office, she most likely will not wear it as it is causing her significant discomfort.

## 2024-01-29 NOTE — CONSULT LETTER
[Dear  ___] : Dear  [unfilled], [Courtesy Letter:] : I had the pleasure of seeing your patient, [unfilled], in my office today. [Please see my note below.] : Please see my note below. [Consult Closing:] : Thank you very much for allowing me to participate in the care of this patient.  If you have any questions, please do not hesitate to contact me. [Sincerely,] : Sincerely, [FreeTextEntry3] :    Yessi Jim PA-C, [facility]

## 2024-01-29 NOTE — PHYSICAL EXAM
[JVD] : no jugular venous distention  [Respiratory Effort] : normal respiratory effort [Alert] : alert [Calm] : calm [de-identified] : overweight  [de-identified] : normal [de-identified] : Moderately protuberant abdomen, moderate to large diastasis recti. [de-identified] : Incision clean, dry, intact, no edema, no erythema, no drainage

## 2024-04-22 NOTE — ED PROVIDER NOTE - AXIS
----- Message from Javier Flores DO sent at 4/22/2024 12:20 PM CDT -----  Please notify the patient of results.  Follow-up as planned.  
Echocardiogram 4/22/2024:  Normal left ventricular systolic function with ejection fraction of 67 %.  Mildly increased left ventricular wall thickness.  Indeterminate left ventricular diastolic dysfunction.  Visually the right ventricle appears slightly enlarged and systolic function mildly reduced.  Normal right ventricular systolic pressure 30 mmHg.  Mildly enlarged left atrial chamber size.  Left atrial volume index of 34.3 ml/m².  Aortic valve sclerosis.  No aortic valve stenosis.  Compared to prior echocardiogram 05/16/2016 there is now indeterminate diastolic function.    Relayed results and recommendations to patient, verbalized understanding.   
Normal

## 2024-08-19 NOTE — ED PROVIDER NOTE - WR INTERPRETATION DATE TIME  5
Mar Prado is a 80 year old female presenting to the Urgent Care today for L eye injury that happened at 0900, pt reports she pulled a cord out of the wall and the plug hit her in the L eye, pt has redness to the lower part of her eye, pt denies vision changes, pt reports tenderness to her eye     Visual acuity done    OTC use: ibuprofen       Allergies and Medications were reviewed and updated if necessary.    Pharmacy reviewed and updated to Patient's preference.    Patient would like communication of their results via:      Cell Phone:   Telephone Information:   Mobile [phone]     Okay to leave a message containing results? Yes    Patient advised staff will contact with positive results only. Patient agrees. Patient instructed can also view results on [employer].   14-Jul-2023 08:59

## 2024-12-11 NOTE — ED ADULT TRIAGE NOTE - NS_BH TRG QUESTION8_ED_ALL_ED
[Patient Intake Form Reviewed] : Patient intake form was reviewed [Shortness Of Breath] : shortness of breath [Wheezing] : wheezing [Cough] : cough [Negative] : Allergic/Immunologic [Chest Pain] : chest pain [Fatigue] : fatigue [Skin Rash] : skin rash [Fever] : no fever [Chills] : no chills [Night Sweats] : no night sweats [Dysphagia] : no dysphagia [Loss of Hearing] : no loss of hearing [Nosebleeds] : no nosebleeds [Hoarseness] : no hoarseness [Odynophagia] : no odynophagia [Mucosal Pain] : no mucosal pain [Palpitations] : no palpitations [Leg Claudication] : no intermittent leg claudication [Lower Ext Edema] : no lower extremity edema [Abdominal Pain] : no abdominal pain [Vomiting] : no vomiting [Reflux/Heartburn] : no reflux/ heartburn [Hernia] : no hernia [Confused] : no confusion [Dizziness] : no dizziness [Fainting] : no fainting [Difficulty Walking] : no difficulty walking [FreeTextEntry3] : vision change [FreeTextEntry4] : dry mouth [FreeTextEntry7] : fecal incontinence [de-identified] : rashes under breasts and abdominal pannus [de-identified] : migraines Anxiety (includes Panic, OCD)/Elisha (includes Bipolar Disorder)